# Patient Record
Sex: MALE | Race: WHITE
[De-identification: names, ages, dates, MRNs, and addresses within clinical notes are randomized per-mention and may not be internally consistent; named-entity substitution may affect disease eponyms.]

---

## 2018-06-22 ENCOUNTER — HOSPITAL ENCOUNTER (OUTPATIENT)
Dept: HOSPITAL 47 - LABWHC1 | Age: 78
Discharge: HOME | End: 2018-06-22
Payer: MEDICARE

## 2018-06-22 DIAGNOSIS — G52.9: Primary | ICD-10-CM

## 2018-06-22 LAB — BUN SERPL-SCNC: 23 MG/DL (ref 9–20)

## 2018-06-22 PROCEDURE — 82565 ASSAY OF CREATININE: CPT

## 2018-06-22 PROCEDURE — 36415 COLL VENOUS BLD VENIPUNCTURE: CPT

## 2018-06-22 PROCEDURE — 84520 ASSAY OF UREA NITROGEN: CPT

## 2018-06-23 ENCOUNTER — HOSPITAL ENCOUNTER (OUTPATIENT)
Dept: HOSPITAL 47 - RADMRIMAIN | Age: 78
Discharge: HOME | End: 2018-06-23
Attending: NURSE PRACTITIONER
Payer: MEDICARE

## 2018-06-23 DIAGNOSIS — G31.9: Primary | ICD-10-CM

## 2018-06-23 DIAGNOSIS — G93.89: ICD-10-CM

## 2018-06-23 DIAGNOSIS — R90.82: ICD-10-CM

## 2018-06-23 PROCEDURE — 70553 MRI BRAIN STEM W/O & W/DYE: CPT

## 2018-06-23 NOTE — MR
EXAMINATION TYPE: MR brain wo/w con

 

DATE OF EXAM: 6/23/2018

 

COMPARISON: 9/23/2016

 

HISTORY: 78-year-old male cranial nerve disorder unspecified, dizziness

 

TECHNIQUE:  Multiplanar, multisequence images of the brain and brainstem were acquired before and aft
er administration of  7 mL IV Gadavist.  Diffusion weighted imaging is performed. 

 

FINDINGS:  

No evidence for acute infarction, hemorrhage, mass, mass effect, midline shift, herniation, effacemen
t of basal cisterns, or extra-axial fluid collection. 

 

There is moderate generalized supratentorial volume loss with secondary mild ventriculomegaly.

 

Major intracranial flow voids are intact. Dominant left vertebral artery.

 

T2/FLAIR weighted sequences show moderate scattered burden of T2 bright white matter change in both c
erebral hemispheres in the subcortical, deep white matter, and periventricular regions. These changes
 are minimally increased from the prior exam.

 

Midline structures demonstrate partially empty sella and otherwise normal morphology.  The craniocerv
ical junction is normal. 

 

No cerebellopontine angle mass or lesion in the internal auditory canal.

 

Post contrast images demonstrate no evidence of pathologic enhancement.  Dural venous sinuses are pat
ent.

 

Small mucosal retention cyst along the floor of the right maxillary sinus. Mild mucosal thickening le
ft ethmoid air cells. Globes are intact.

 

 

IMPRESSION:

 

1. Similar moderate atrophy and mild ex vacuo ventriculomegaly.

2. Moderate scattered T2 bright white matter change, nonspecific, likely relating to changes of chron
ic small vessel ischemic disease. Minimally increased from 2016.

3. No CP angle or IAC mass.

4. No abnormal enhancing lesions. No acute intracranial abnormality.

## 2021-03-05 ENCOUNTER — HOSPITAL ENCOUNTER (OUTPATIENT)
Dept: HOSPITAL 47 - LABWHC1 | Age: 81
Discharge: HOME | End: 2021-03-05
Attending: SURGERY
Payer: MEDICARE

## 2021-03-05 DIAGNOSIS — Z20.822: Primary | ICD-10-CM

## 2021-03-12 ENCOUNTER — HOSPITAL ENCOUNTER (OUTPATIENT)
Dept: HOSPITAL 47 - ORWHC2ENDO | Age: 81
Discharge: HOME | End: 2021-03-12
Attending: SURGERY
Payer: MEDICARE

## 2021-03-12 VITALS — RESPIRATION RATE: 16 BRPM

## 2021-03-12 VITALS — SYSTOLIC BLOOD PRESSURE: 117 MMHG | HEART RATE: 64 BPM | DIASTOLIC BLOOD PRESSURE: 56 MMHG

## 2021-03-12 VITALS — TEMPERATURE: 97.9 F

## 2021-03-12 VITALS — BODY MASS INDEX: 20.9 KG/M2

## 2021-03-12 DIAGNOSIS — Z79.899: ICD-10-CM

## 2021-03-12 DIAGNOSIS — Z79.1: ICD-10-CM

## 2021-03-12 DIAGNOSIS — M10.9: ICD-10-CM

## 2021-03-12 DIAGNOSIS — K57.31: ICD-10-CM

## 2021-03-12 DIAGNOSIS — Z90.79: ICD-10-CM

## 2021-03-12 DIAGNOSIS — Z80.7: ICD-10-CM

## 2021-03-12 DIAGNOSIS — D12.5: Primary | ICD-10-CM

## 2021-03-12 DIAGNOSIS — H40.9: ICD-10-CM

## 2021-03-12 DIAGNOSIS — F02.80: ICD-10-CM

## 2021-03-12 DIAGNOSIS — Z85.46: ICD-10-CM

## 2021-03-12 DIAGNOSIS — G47.52: ICD-10-CM

## 2021-03-12 DIAGNOSIS — G20: ICD-10-CM

## 2021-03-12 DIAGNOSIS — Z98.890: ICD-10-CM

## 2021-03-12 PROCEDURE — 45385 COLONOSCOPY W/LESION REMOVAL: CPT

## 2021-03-12 PROCEDURE — 88305 TISSUE EXAM BY PATHOLOGIST: CPT

## 2021-03-12 NOTE — P.GSHP
History of Present Illness


H&P Date: 03/12/21





81-year-old male presents today for a colonoscopy.  He has had a recent positive

 ColoGuard test.  He admits to a small amount of blood in his stool.  His last 

colonoscopy was approximately 6 years ago.





- Review of Systems


All systems: negative





Past Medical History


Past Medical History: Dementia, Eye Disorder


Additional Past Medical History / Comment(s): difficult to get bowel movement 

started and stools size of a pencil,positive cologuard,hx REM sleep disorder-

severe muscle twitches/thrashing at times,parkinson's,gout,vaccinated w/ both 

covid vaccines,glaucoma efren eyes,prostate CA


History of Any Multi-Drug Resistant Organisms: None Reported


Past Surgical History: Hernia Repair, Prostate Surgery


Additional Past Surgical History / Comment(s): prostatectomy


Past Anesthesia/Blood Transfusion Reactions: No Reported Reaction


Smoking Status: Never smoker





- Past Family History


  ** Mother


Family Medical History: No Reported History





  ** Father


Family Medical History: Cancer


Additional Family Medical History / Comment(s): blood CA





Medications and Allergies


                                Home Medications











 Medication  Instructions  Recorded  Confirmed  Type


 


Cholecalciferol [Vitamin D3 (25 25 mcg PO DAILY 03/10/21 03/10/21 History





Mcg = 1000 Iu)]    


 


Donepezil HCl [Aricept] 10 mg PO HS 03/10/21 03/10/21 History


 


Ginkgo Biloba Leaf Extract [Ginkgo 120 mg PO DAILY 03/10/21 03/10/21 History





Biloba]    


 


Latanoprost Ophth [Xalatan 0.005%] 1 drops BOTH EYES HS 03/10/21 03/10/21 

History


 


Memantine HCl 10 mg PO BID 03/10/21 03/10/21 History


 


Naproxen Sodium [Aleve] 220 mg PO BID 03/10/21 03/10/21 History


 


Psyllium Husk (with Sugar) 1 tsp PO HS 03/10/21 03/10/21 History





[Metamucil Powder]    


 


Purge Supplement 2 tab PO DAILY 03/10/21 03/10/21 History


 


Timolol 0.5% Ophth Soln [Timoptic 1 drop BOTH EYES QAM 03/10/21 03/10/21 History





0.5% Ophth Soln]    


 


Turmeric Root Extract [Turmeric] 3,000 mg PO DAILY 03/10/21 03/10/21 History


 


Vitamin B-50  1 tab PO DAILY 03/10/21 03/10/21 History


 


polyethylene glycoL 3350 [Miralax] 17 gm PO HS 03/10/21 03/10/21 History








                                    Allergies











Allergy/AdvReac Type Severity Reaction Status Date / Time


 


No Known Allergies Allergy   Verified 03/12/21 08:41














Surgical - Exam


Osteopathic Statement: *.  No significant issues noted on an osteopathic 

structural exam other than those noted in the History and Physical/Consult.


                                   Vital Signs











Temp Pulse Resp BP Pulse Ox


 


 97.9 F   60   18   142/79   97 


 


 03/12/21 09:00  03/12/21 09:00  03/12/21 09:00  03/12/21 09:00  03/12/21 09:00














- General


well developed, well nourished





- ENT


decreased hearing





- Neck


trachea midline





- Abdomen


Abdomen: soft, non tender





- Psychiatric


oriented to time, oriented to person, oriented to place





Assessment and Plan


Plan: 





81-year-old male with positive Cologuard test.  Plan is for colonoscopy.  Risks,

 benefits and alternatives were provided to the patient.  He did provide consent

 prior to attending the endoscopy suite.

## 2021-03-12 NOTE — P.PCN
Date of Procedure: 03/12/21


Preoperative Diagnosis: 


Positive Cologuard test


Postoperative Diagnosis: 


Sigmoid colon polyp


Diverticulosis


Procedure(s) Performed: 


Colonoscopy with hot snare polypectomy


Anesthesia: MAC


Surgeon: Shreya Heller


Pathology: other (Sigmoid colon polyp)


Condition: stable


Disposition: same day


Indications for Procedure: 


81-year-old male with recent positive cologuard test.  He also admits to 

occasional small amount of blood in his stool.  Last colonoscopy was 6 years 

ago.  Risks, benefits and alternatives to the procedure were provided to the 

patient.  He did provide consent prior to attending the endoscopy suite.


Operative Findings: 


Sigmoid colon polyp


Diverticulosis


Description of Procedure: 


The patient was brought into the endoscopy suite.  He was then placed in left 

lateral decubitus position and adequate sedation was achieved using conscious 

sedation.  A digital rectal exam was performed and mild internal hemorrhoids 

were palpated.  An endoscope was then placed in the rectum and advanced to the 

cecum as identified by landmarks including the appendiceal orifice and the 

ileocecal valve.  The prep was good.  The colonoscope was then slowly withdrawn,

examining for any mucosal abnormalities.  The cecum, ascending, transverse, 

descending and sigmoid colon were visualized adequately.  There were no large 

neoplastic lesions throughout the colon.  A small polyp was noted in the sigmoid

colon.  This was removed with hot snare polypectomy.  Significant amount of 

diverticulosis was noted scattered throughout the descending and sigmoid colon. 

Retroflexion was performed in the rectum and internal hemorrhoids were visible. 

Excess air was removed, the colonoscope withdrawn and the procedure terminated. 

The patient was then transferred to the recovery unit in stable condition.  

Repeat colonoscopy should be performed based on patient's symptoms due to his 

age.

## 2022-06-19 ENCOUNTER — HOSPITAL ENCOUNTER (INPATIENT)
Dept: HOSPITAL 47 - EC | Age: 82
LOS: 12 days | Discharge: HOME | DRG: 177 | End: 2022-07-01
Attending: HOSPITALIST | Admitting: HOSPITALIST
Payer: MEDICARE

## 2022-06-19 VITALS — BODY MASS INDEX: 20.4 KG/M2

## 2022-06-19 DIAGNOSIS — Z79.899: ICD-10-CM

## 2022-06-19 DIAGNOSIS — G47.10: ICD-10-CM

## 2022-06-19 DIAGNOSIS — Z85.828: ICD-10-CM

## 2022-06-19 DIAGNOSIS — N17.9: ICD-10-CM

## 2022-06-19 DIAGNOSIS — E86.0: ICD-10-CM

## 2022-06-19 DIAGNOSIS — G30.9: ICD-10-CM

## 2022-06-19 DIAGNOSIS — Z20.822: ICD-10-CM

## 2022-06-19 DIAGNOSIS — D72.810: ICD-10-CM

## 2022-06-19 DIAGNOSIS — G20: ICD-10-CM

## 2022-06-19 DIAGNOSIS — I50.9: ICD-10-CM

## 2022-06-19 DIAGNOSIS — N18.9: ICD-10-CM

## 2022-06-19 DIAGNOSIS — T50.905A: ICD-10-CM

## 2022-06-19 DIAGNOSIS — G47.52: ICD-10-CM

## 2022-06-19 DIAGNOSIS — G47.33: ICD-10-CM

## 2022-06-19 DIAGNOSIS — Z90.79: ICD-10-CM

## 2022-06-19 DIAGNOSIS — J69.0: Primary | ICD-10-CM

## 2022-06-19 DIAGNOSIS — R13.10: ICD-10-CM

## 2022-06-19 DIAGNOSIS — Z87.01: ICD-10-CM

## 2022-06-19 DIAGNOSIS — N13.9: ICD-10-CM

## 2022-06-19 DIAGNOSIS — R33.9: ICD-10-CM

## 2022-06-19 DIAGNOSIS — F02.80: ICD-10-CM

## 2022-06-19 DIAGNOSIS — G92.8: ICD-10-CM

## 2022-06-19 LAB
ALBUMIN SERPL-MCNC: 3.9 G/DL (ref 3.5–5)
ALP SERPL-CCNC: 69 U/L (ref 38–126)
ALT SERPL-CCNC: 14 U/L (ref 4–49)
ANION GAP SERPL CALC-SCNC: 8 MMOL/L
AST SERPL-CCNC: 28 U/L (ref 17–59)
BASOPHILS # BLD AUTO: 0.2 K/UL (ref 0–0.2)
BASOPHILS NFR BLD AUTO: 3 %
BUN SERPL-SCNC: 26 MG/DL (ref 9–20)
CALCIUM SPEC-MCNC: 8.6 MG/DL (ref 8.4–10.2)
CHLORIDE SERPL-SCNC: 103 MMOL/L (ref 98–107)
CO2 SERPL-SCNC: 27 MMOL/L (ref 22–30)
EOSINOPHIL # BLD AUTO: 0.2 K/UL (ref 0–0.7)
EOSINOPHIL NFR BLD AUTO: 4 %
ERYTHROCYTE [DISTWIDTH] IN BLOOD BY AUTOMATED COUNT: 4.53 M/UL (ref 4.3–5.9)
ERYTHROCYTE [DISTWIDTH] IN BLOOD: 12.4 % (ref 11.5–15.5)
GLUCOSE SERPL-MCNC: 89 MG/DL (ref 74–99)
HCT VFR BLD AUTO: 40.9 % (ref 39–53)
HGB BLD-MCNC: 13.7 GM/DL (ref 13–17.5)
LYMPHOCYTES # SPEC AUTO: 0.5 K/UL (ref 1–4.8)
LYMPHOCYTES NFR SPEC AUTO: 9 %
MCH RBC QN AUTO: 30.3 PG (ref 25–35)
MCHC RBC AUTO-ENTMCNC: 33.5 G/DL (ref 31–37)
MCV RBC AUTO: 90.3 FL (ref 80–100)
MONOCYTES # BLD AUTO: 0.7 K/UL (ref 0–1)
MONOCYTES NFR BLD AUTO: 13 %
NEUTROPHILS # BLD AUTO: 3.7 K/UL (ref 1.3–7.7)
NEUTROPHILS NFR BLD AUTO: 68 %
PH UR: 6.5 [PH] (ref 5–8)
PLATELET # BLD AUTO: 183 K/UL (ref 150–450)
POTASSIUM SERPL-SCNC: 4.6 MMOL/L (ref 3.5–5.1)
PROT SERPL-MCNC: 6.9 G/DL (ref 6.3–8.2)
SODIUM SERPL-SCNC: 138 MMOL/L (ref 137–145)
SP GR UR: 1.01 (ref 1–1.03)
UROBILINOGEN UR QL STRIP: <2 MG/DL (ref ?–2)
WBC # BLD AUTO: 5.5 K/UL (ref 3.8–10.6)

## 2022-06-19 PROCEDURE — 71045 X-RAY EXAM CHEST 1 VIEW: CPT

## 2022-06-19 PROCEDURE — 82607 VITAMIN B-12: CPT

## 2022-06-19 PROCEDURE — 81003 URINALYSIS AUTO W/O SCOPE: CPT

## 2022-06-19 PROCEDURE — 80053 COMPREHEN METABOLIC PANEL: CPT

## 2022-06-19 PROCEDURE — 82746 ASSAY OF FOLIC ACID SERUM: CPT

## 2022-06-19 PROCEDURE — 84484 ASSAY OF TROPONIN QUANT: CPT

## 2022-06-19 PROCEDURE — 94640 AIRWAY INHALATION TREATMENT: CPT

## 2022-06-19 PROCEDURE — 74176 CT ABD & PELVIS W/O CONTRAST: CPT

## 2022-06-19 PROCEDURE — 87070 CULTURE OTHR SPECIMN AEROBIC: CPT

## 2022-06-19 PROCEDURE — 96374 THER/PROPH/DIAG INJ IV PUSH: CPT

## 2022-06-19 PROCEDURE — 83605 ASSAY OF LACTIC ACID: CPT

## 2022-06-19 PROCEDURE — 87502 INFLUENZA DNA AMP PROBE: CPT

## 2022-06-19 PROCEDURE — 94760 N-INVAS EAR/PLS OXIMETRY 1: CPT

## 2022-06-19 PROCEDURE — 87205 SMEAR GRAM STAIN: CPT

## 2022-06-19 PROCEDURE — 93005 ELECTROCARDIOGRAM TRACING: CPT

## 2022-06-19 PROCEDURE — 82140 ASSAY OF AMMONIA: CPT

## 2022-06-19 PROCEDURE — 80076 HEPATIC FUNCTION PANEL: CPT

## 2022-06-19 PROCEDURE — 84443 ASSAY THYROID STIM HORMONE: CPT

## 2022-06-19 PROCEDURE — 83880 ASSAY OF NATRIURETIC PEPTIDE: CPT

## 2022-06-19 PROCEDURE — 95816 EEG AWAKE AND DROWSY: CPT

## 2022-06-19 PROCEDURE — 84145 PROCALCITONIN (PCT): CPT

## 2022-06-19 PROCEDURE — 80048 BASIC METABOLIC PNL TOTAL CA: CPT

## 2022-06-19 PROCEDURE — 87040 BLOOD CULTURE FOR BACTERIA: CPT

## 2022-06-19 PROCEDURE — 70551 MRI BRAIN STEM W/O DYE: CPT

## 2022-06-19 PROCEDURE — 87635 SARS-COV-2 COVID-19 AMP PRB: CPT

## 2022-06-19 PROCEDURE — 85025 COMPLETE CBC W/AUTO DIFF WBC: CPT

## 2022-06-19 PROCEDURE — 93306 TTE W/DOPPLER COMPLETE: CPT

## 2022-06-19 PROCEDURE — 99285 EMERGENCY DEPT VISIT HI MDM: CPT

## 2022-06-19 PROCEDURE — 36415 COLL VENOUS BLD VENIPUNCTURE: CPT

## 2022-06-19 PROCEDURE — 70450 CT HEAD/BRAIN W/O DYE: CPT

## 2022-06-19 PROCEDURE — 83735 ASSAY OF MAGNESIUM: CPT

## 2022-06-19 PROCEDURE — 71046 X-RAY EXAM CHEST 2 VIEWS: CPT

## 2022-06-19 RX ADMIN — MEMANTINE HYDROCHLORIDE SCH MG: 10 TABLET ORAL at 22:51

## 2022-06-19 RX ADMIN — CEFAZOLIN SCH MLS/HR: 330 INJECTION, POWDER, FOR SOLUTION INTRAMUSCULAR; INTRAVENOUS at 18:42

## 2022-06-19 RX ADMIN — LATANOPROST SCH DROPS: 50 SOLUTION OPHTHALMIC at 22:52

## 2022-06-19 NOTE — ED
URI HPI





- General


Chief Complaint: Upper Respiratory Infection


Stated Complaint: SOB, weakness


Time Seen by Provider: 06/19/22 14:04


Source: patient, family


Mode of arrival: wheelchair


Limitations: no limitations





- History of Present Illness


Initial Comments: 


Patient is an 82-year-old male with history of Parkinson's presenting with chief

complaint of URI-like symptoms.  His wife at bedside states that earlier in the 

week she came down with cold, and now he is starting to have the same symptoms. 

He has sounded congested, has been having a cough but he has not been able to 

produce sputum, and generalized weakness.  His wife states that there has been 

no sudden change in his mental status.  She states that today she noticed he had

a fever.  Denies any chest pain, shortness of breath, sore throat, dysphagia, 

headache, vision or hearing changes, abdominal pain, vomiting, diarrhea, 

dysuria, hematuria.








- Related Data


                                Home Medications











 Medication  Instructions  Recorded  Confirmed


 


Cholecalciferol [Vitamin D3 (25 25 mcg PO DAILY 03/10/21 03/12/21





Mcg = 1000 Iu)]   


 


Donepezil HCl [Aricept] 10 mg PO HS 03/10/21 03/12/21


 


Latanoprost Ophth [Xalatan 0.005%] 1 drops BOTH EYES HS 03/10/21 03/12/21


 


Memantine HCl 10 mg PO BID 03/10/21 03/12/21


 


Timolol 0.5% Ophth Soln [Timoptic 1 drop BOTH EYES QA 03/10/21 03/12/21





0.5% Ophth Soln]   


 


Turmeric Root Extract [Turmeric] 3,000 mg PO DAILY 03/10/21 03/12/21


 


Vitamin B-50  1 tab PO DAILY 03/10/21 03/12/21


 


polyethylene glycoL 3350 [Miralax] 17 gm PO HS 03/10/21 03/12/21


 


Carbidopa-Levodopa  mg 1 tab PO DAILY@0800 06/19/22 06/19/22





[Sinemet ]   


 


Carbidopa-Levodopa  mg 2 tab PO DAILY@1300 06/19/22 06/19/22





[Sinemet ]   


 


Ginkgo Biloba Leaf Extract [Ginkgo 125 mg PO DAILY 06/19/22 06/19/22





Biloba]   


 


allopurinoL [Allopurinol] 100 mg PO W/SUPPER 06/19/22 06/19/22











                                    Allergies











Allergy/AdvReac Type Severity Reaction Status Date / Time


 


No Known Allergies Allergy   Verified 06/19/22 16:06














Review of Systems


ROS Statement: 


Those systems with pertinent positive or pertinent negative responses have been 

documented in the HPI.





ROS Other: All systems not noted in ROS Statement are negative.





Past Medical History


Past Medical History: Dementia


Additional Past Medical History / Comment(s): Parkinson


History of Any Multi-Drug Resistant Organisms: None Reported


Past Surgical History: Hernia Repair, Prostate Surgery


Past Psychological History: No Psychological Hx Reported


Smoking Status: Never smoker


Past Alcohol Use History: None Reported


Past Drug Use History: None Reported





General Exam


Limitations: altered mental status (Due to Parkinson's), physical limitation 

(Due to Parkinson's)


General appearance: alert, in no apparent distress


Head exam: Present: atraumatic, normocephalic, normal inspection


Eye exam: Present: normal appearance, EOMI.  Absent: scleral icterus


ENT exam: Present: normal exam, normal oropharynx, mucous membranes moist, TM's 

normal bilaterally, normal external ear exam


Neck exam: Present: normal inspection.  Absent: tenderness


Respiratory exam: Present: rales.  Absent: respiratory distress, wheezes, 

rhonchi, stridor


Cardiovascular Exam: Present: regular rate, normal rhythm, normal heart sounds. 

Absent: systolic murmur, diastolic murmur, rubs, gallop, clicks


GI/Abdominal exam: Present: soft, normal bowel sounds.  Absent: distended, 

tenderness, guarding, rebound, rigid


Extremities exam: Present: normal inspection, full ROM.  Absent: tenderness


Neurological exam: Present: alert, altered (Wife and son at bedside states that 

this is his baseline), CN II-XII intact


  ** Expanded


Speech: Present: fluid speech


Cranial nerves: EOM's Intact: Normal, Tongue Deviation: Normal, Facial 

Sensation: Normal


Motor strength exam: RUE: 5, LUE: 5, RLE: 5, LLE: 5


Eye Response: (4) open spontaneously


Motor Response: (6) obeys commands


Verbal Response: (5) oriented


Chelsie Total: 15


Psychiatric exam: Present: normal affect, normal mood


Skin exam: Present: warm, dry, intact, normal color.  Absent: rash





Course


                                   Vital Signs











  06/19/22 06/19/22





  13:59 17:35


 


Temperature 98.9 F 


 


Pulse Rate 88 78


 


Respiratory 22 18





Rate  


 


Blood Pressure 145/79 141/96


 


O2 Sat by Pulse 98 97





Oximetry  














Medical Decision Making





- Medical Decision Making


Patient is an 82-year-old male with history of Parkinson's presenting with chief

complaint of fever and productive cough.  His wife who is has recently been ill 

states that he is also had some increased generalized weakness.  On examination 

there are some rales on auscultation of the lungs.  At this time he is afebrile.

 He is negative for Covid and influenza.  Chest x-ray suggests congestive heart 

failure with small pleural effusions, atheromatous aorta.  Acute pneumonia is 

not excluded.  Lab work obtained shows no elevated white count, BNP and troponin

are WNL.  Creatinine BUN a mildly elevated, no recent values for comparison.  

Given the patient's presentation, I feel this is more likely pneumonia, and 

given his advanced age in Parkinson's, I feel his best outcome would come from 

inpatient admission.  I spoke to Mariela Rogers from [facility] who agreed to admit the

patient.  I discussed this plan with the patient and his family, they conveyed 

verbal understanding and agreed to the plan.  I discussed this case with my 

attending Dr. Stoner.








- Lab Data


Result diagrams: 


                                 06/19/22 15:48





                                 06/19/22 15:48


                                   Lab Results











  06/19/22 06/19/22 06/19/22 Range/Units





  14:32 14:32 15:20 


 


WBC     (3.8-10.6)  k/uL


 


RBC     (4.30-5.90)  m/uL


 


Hgb     (13.0-17.5)  gm/dL


 


Hct     (39.0-53.0)  %


 


MCV     (80.0-100.0)  fL


 


MCH     (25.0-35.0)  pg


 


MCHC     (31.0-37.0)  g/dL


 


RDW     (11.5-15.5)  %


 


Plt Count     (150-450)  k/uL


 


MPV     


 


Neutrophils %     %


 


Lymphocytes %     %


 


Monocytes %     %


 


Eosinophils %     %


 


Basophils %     %


 


Neutrophils #     (1.3-7.7)  k/uL


 


Lymphocytes #     (1.0-4.8)  k/uL


 


Monocytes #     (0-1.0)  k/uL


 


Eosinophils #     (0-0.7)  k/uL


 


Basophils #     (0-0.2)  k/uL


 


Sodium     (137-145)  mmol/L


 


Potassium     (3.5-5.1)  mmol/L


 


Chloride     ()  mmol/L


 


Carbon Dioxide     (22-30)  mmol/L


 


Anion Gap     mmol/L


 


BUN     (9-20)  mg/dL


 


Creatinine     (0.66-1.25)  mg/dL


 


Est GFR (CKD-EPI)AfAm     (>60 ml/min/1.73 sqM)  


 


Est GFR (CKD-EPI)NonAf     (>60 ml/min/1.73 sqM)  


 


Glucose     (74-99)  mg/dL


 


Plasma Lactic Acid Nikhil     (0.7-2.0)  mmol/L


 


Calcium     (8.4-10.2)  mg/dL


 


Total Bilirubin     (0.2-1.3)  mg/dL


 


AST     (17-59)  U/L


 


ALT     (4-49)  U/L


 


Alkaline Phosphatase     ()  U/L


 


Troponin I     (0.000-0.034)  ng/mL


 


NT-Pro-B Natriuret Pep     pg/mL


 


Total Protein     (6.3-8.2)  g/dL


 


Albumin     (3.5-5.0)  g/dL


 


Urine Color    Yellow  


 


Urine Appearance    Clear  (Clear)  


 


Urine pH    6.5  (5.0-8.0)  


 


Ur Specific Gravity    1.013  (1.001-1.035)  


 


Urine Protein    Negative  (Negative)  


 


Urine Glucose (UA)    Negative  (Negative)  


 


Urine Ketones    Negative  (Negative)  


 


Urine Blood    Negative  (Negative)  


 


Urine Nitrite    Negative  (Negative)  


 


Urine Bilirubin    Negative  (Negative)  


 


Urine Urobilinogen    <2.0  (<2.0)  mg/dL


 


Ur Leukocyte Esterase    Negative  (Negative)  


 


Coronavirus (PCR)   Not Detected   (Not Detectd)  


 


Influenza Type A RNA  Not Detected    (Not Detectd)  


 


Influenza Type B (PCR)  Not Detected    (Not Detectd)  














  06/19/22 06/19/22 06/19/22 Range/Units





  15:48 15:48 15:48 


 


WBC  5.5    (3.8-10.6)  k/uL


 


RBC  4.53    (4.30-5.90)  m/uL


 


Hgb  13.7    (13.0-17.5)  gm/dL


 


Hct  40.9    (39.0-53.0)  %


 


MCV  90.3    (80.0-100.0)  fL


 


MCH  30.3    (25.0-35.0)  pg


 


MCHC  33.5    (31.0-37.0)  g/dL


 


RDW  12.4    (11.5-15.5)  %


 


Plt Count  183    (150-450)  k/uL


 


MPV  8.0    


 


Neutrophils %  68    %


 


Lymphocytes %  9    %


 


Monocytes %  13    %


 


Eosinophils %  4    %


 


Basophils %  3    %


 


Neutrophils #  3.7    (1.3-7.7)  k/uL


 


Lymphocytes #  0.5 L    (1.0-4.8)  k/uL


 


Monocytes #  0.7    (0-1.0)  k/uL


 


Eosinophils #  0.2    (0-0.7)  k/uL


 


Basophils #  0.2    (0-0.2)  k/uL


 


Sodium   138   (137-145)  mmol/L


 


Potassium   4.6   (3.5-5.1)  mmol/L


 


Chloride   103   ()  mmol/L


 


Carbon Dioxide   27   (22-30)  mmol/L


 


Anion Gap   8   mmol/L


 


BUN   26 H   (9-20)  mg/dL


 


Creatinine   1.29 H   (0.66-1.25)  mg/dL


 


Est GFR (CKD-EPI)AfAm   60   (>60 ml/min/1.73 sqM)  


 


Est GFR (CKD-EPI)NonAf   52   (>60 ml/min/1.73 sqM)  


 


Glucose   89   (74-99)  mg/dL


 


Plasma Lactic Acid Nikhil    1.2  (0.7-2.0)  mmol/L


 


Calcium   8.6   (8.4-10.2)  mg/dL


 


Total Bilirubin   0.3   (0.2-1.3)  mg/dL


 


AST   28   (17-59)  U/L


 


ALT   14   (4-49)  U/L


 


Alkaline Phosphatase   69   ()  U/L


 


Troponin I     (0.000-0.034)  ng/mL


 


NT-Pro-B Natriuret Pep     pg/mL


 


Total Protein   6.9   (6.3-8.2)  g/dL


 


Albumin   3.9   (3.5-5.0)  g/dL


 


Urine Color     


 


Urine Appearance     (Clear)  


 


Urine pH     (5.0-8.0)  


 


Ur Specific Gravity     (1.001-1.035)  


 


Urine Protein     (Negative)  


 


Urine Glucose (UA)     (Negative)  


 


Urine Ketones     (Negative)  


 


Urine Blood     (Negative)  


 


Urine Nitrite     (Negative)  


 


Urine Bilirubin     (Negative)  


 


Urine Urobilinogen     (<2.0)  mg/dL


 


Ur Leukocyte Esterase     (Negative)  


 


Coronavirus (PCR)     (Not Detectd)  


 


Influenza Type A RNA     (Not Detectd)  


 


Influenza Type B (PCR)     (Not Detectd)  














  06/19/22 06/19/22 Range/Units





  15:48 15:48 


 


WBC    (3.8-10.6)  k/uL


 


RBC    (4.30-5.90)  m/uL


 


Hgb    (13.0-17.5)  gm/dL


 


Hct    (39.0-53.0)  %


 


MCV    (80.0-100.0)  fL


 


MCH    (25.0-35.0)  pg


 


MCHC    (31.0-37.0)  g/dL


 


RDW    (11.5-15.5)  %


 


Plt Count    (150-450)  k/uL


 


MPV    


 


Neutrophils %    %


 


Lymphocytes %    %


 


Monocytes %    %


 


Eosinophils %    %


 


Basophils %    %


 


Neutrophils #    (1.3-7.7)  k/uL


 


Lymphocytes #    (1.0-4.8)  k/uL


 


Monocytes #    (0-1.0)  k/uL


 


Eosinophils #    (0-0.7)  k/uL


 


Basophils #    (0-0.2)  k/uL


 


Sodium    (137-145)  mmol/L


 


Potassium    (3.5-5.1)  mmol/L


 


Chloride    ()  mmol/L


 


Carbon Dioxide    (22-30)  mmol/L


 


Anion Gap    mmol/L


 


BUN    (9-20)  mg/dL


 


Creatinine    (0.66-1.25)  mg/dL


 


Est GFR (CKD-EPI)AfAm    (>60 ml/min/1.73 sqM)  


 


Est GFR (CKD-EPI)NonAf    (>60 ml/min/1.73 sqM)  


 


Glucose    (74-99)  mg/dL


 


Plasma Lactic Acid Nikhil    (0.7-2.0)  mmol/L


 


Calcium    (8.4-10.2)  mg/dL


 


Total Bilirubin    (0.2-1.3)  mg/dL


 


AST    (17-59)  U/L


 


ALT    (4-49)  U/L


 


Alkaline Phosphatase    ()  U/L


 


Troponin I   <0.012  (0.000-0.034)  ng/mL


 


NT-Pro-B Natriuret Pep  325   pg/mL


 


Total Protein    (6.3-8.2)  g/dL


 


Albumin    (3.5-5.0)  g/dL


 


Urine Color    


 


Urine Appearance    (Clear)  


 


Urine pH    (5.0-8.0)  


 


Ur Specific Gravity    (1.001-1.035)  


 


Urine Protein    (Negative)  


 


Urine Glucose (UA)    (Negative)  


 


Urine Ketones    (Negative)  


 


Urine Blood    (Negative)  


 


Urine Nitrite    (Negative)  


 


Urine Bilirubin    (Negative)  


 


Urine Urobilinogen    (<2.0)  mg/dL


 


Ur Leukocyte Esterase    (Negative)  


 


Coronavirus (PCR)    (Not Detectd)  


 


Influenza Type A RNA    (Not Detectd)  


 


Influenza Type B (PCR)    (Not Detectd)  














Disposition


Clinical Impression: 


 Pneumonia





Disposition: ADMITTED AS IP TO THIS [facility]


Condition: Good


Is patient prescribed a controlled substance at d/c from ED?: No


Referrals: 


Xenia Camacho MD [Primary Care Provider] - 1-2 days


Time of Disposition: 17:54


Decision to Admit Reason: Admit from EC


Decision Date: 06/19/22


Decision Time: 17:54

## 2022-06-19 NOTE — XR
EXAMINATION TYPE: XR chest 2V

 

DATE OF EXAM: 6/19/2022

 

COMPARISON: NONE

 

HISTORY: Cough

 

TECHNIQUE: 2 views

 

FINDINGS: Heart is enlarged. There is some pulmonary vascular congestion. There is blunting of the co
stophrenic angles.

 

IMPRESSION: Congestive heart failure with small pleural effusions. Atheromatous aorta. Acute pneumoni
a not excluded.

## 2022-06-20 LAB
ALBUMIN SERPL-MCNC: 3.5 G/DL (ref 3.5–5)
ALBUMIN/GLOB SERPL: 1.3 {RATIO}
ALP SERPL-CCNC: 65 U/L (ref 38–126)
ALT SERPL-CCNC: 16 U/L (ref 4–49)
ANION GAP SERPL CALC-SCNC: 8 MMOL/L
AST SERPL-CCNC: 26 U/L (ref 17–59)
BASOPHILS # BLD AUTO: 0.1 K/UL (ref 0–0.2)
BASOPHILS NFR BLD AUTO: 3 %
BUN SERPL-SCNC: 17 MG/DL (ref 9–20)
CALCIUM SPEC-MCNC: 8.4 MG/DL (ref 8.4–10.2)
CHLORIDE SERPL-SCNC: 105 MMOL/L (ref 98–107)
CO2 SERPL-SCNC: 26 MMOL/L (ref 22–30)
EOSINOPHIL # BLD AUTO: 0.1 K/UL (ref 0–0.7)
EOSINOPHIL NFR BLD AUTO: 3 %
ERYTHROCYTE [DISTWIDTH] IN BLOOD BY AUTOMATED COUNT: 4.46 M/UL (ref 4.3–5.9)
ERYTHROCYTE [DISTWIDTH] IN BLOOD: 12.2 % (ref 11.5–15.5)
GLOBULIN SER CALC-MCNC: 2.7 G/DL
GLUCOSE SERPL-MCNC: 95 MG/DL (ref 74–99)
HCT VFR BLD AUTO: 39.9 % (ref 39–53)
HGB BLD-MCNC: 13.4 GM/DL (ref 13–17.5)
LYMPHOCYTES # SPEC AUTO: 0.6 K/UL (ref 1–4.8)
LYMPHOCYTES NFR SPEC AUTO: 14 %
MAGNESIUM SPEC-SCNC: 1.8 MG/DL (ref 1.6–2.3)
MCH RBC QN AUTO: 30.2 PG (ref 25–35)
MCHC RBC AUTO-ENTMCNC: 33.7 G/DL (ref 31–37)
MCV RBC AUTO: 89.4 FL (ref 80–100)
MONOCYTES # BLD AUTO: 0.6 K/UL (ref 0–1)
MONOCYTES NFR BLD AUTO: 13 %
NEUTROPHILS # BLD AUTO: 2.8 K/UL (ref 1.3–7.7)
NEUTROPHILS NFR BLD AUTO: 63 %
PH UR: 7 [PH] (ref 5–8)
PLATELET # BLD AUTO: 172 K/UL (ref 150–450)
POTASSIUM SERPL-SCNC: 4.3 MMOL/L (ref 3.5–5.1)
PROT SERPL-MCNC: 6.2 G/DL (ref 6.3–8.2)
SODIUM SERPL-SCNC: 139 MMOL/L (ref 137–145)
SP GR UR: 1.01 (ref 1–1.03)
UROBILINOGEN UR QL STRIP: <2 MG/DL (ref ?–2)
WBC # BLD AUTO: 4.5 K/UL (ref 3.8–10.6)

## 2022-06-20 RX ADMIN — Medication SCH MCG: at 08:43

## 2022-06-20 RX ADMIN — HEPARIN SODIUM SCH UNIT: 5000 INJECTION INTRAVENOUS; SUBCUTANEOUS at 19:56

## 2022-06-20 RX ADMIN — DONEPEZIL HYDROCHLORIDE SCH MG: 10 TABLET ORAL at 08:43

## 2022-06-20 RX ADMIN — DOXYCYCLINE SCH MLS/HR: 100 INJECTION, POWDER, LYOPHILIZED, FOR SOLUTION INTRAVENOUS at 12:53

## 2022-06-20 RX ADMIN — MEMANTINE HYDROCHLORIDE SCH MG: 10 TABLET ORAL at 08:43

## 2022-06-20 RX ADMIN — CEFAZOLIN SCH: 330 INJECTION, POWDER, FOR SOLUTION INTRAMUSCULAR; INTRAVENOUS at 23:08

## 2022-06-20 RX ADMIN — CEFAZOLIN SCH MLS/HR: 330 INJECTION, POWDER, FOR SOLUTION INTRAMUSCULAR; INTRAVENOUS at 12:54

## 2022-06-20 RX ADMIN — FAMOTIDINE SCH MG: 10 INJECTION, SOLUTION INTRAVENOUS at 15:06

## 2022-06-20 RX ADMIN — CARBIDOPA AND LEVODOPA SCH EACH: 25; 100 TABLET ORAL at 08:43

## 2022-06-20 RX ADMIN — TIMOLOL MALEATE SCH DROPS: 5 SOLUTION OPHTHALMIC at 08:44

## 2022-06-20 RX ADMIN — DOXYCYCLINE SCH MLS/HR: 100 INJECTION, POWDER, LYOPHILIZED, FOR SOLUTION INTRAVENOUS at 19:56

## 2022-06-20 RX ADMIN — MEMANTINE HYDROCHLORIDE SCH MG: 10 TABLET ORAL at 19:57

## 2022-06-20 RX ADMIN — CARBIDOPA AND LEVODOPA SCH EACH: 25; 100 TABLET ORAL at 13:38

## 2022-06-20 RX ADMIN — LATANOPROST SCH DROPS: 50 SOLUTION OPHTHALMIC at 19:57

## 2022-06-20 NOTE — CA
Transthoracic Echo Report 

 Name: Balwinder Montalvo 

 MRN:    [id number] 

 Age:    82     Gender:     M 

 

 :    1940 

 Exam Date:     2022 11:25 

 Exam Location: [facility] 

 Ht (in):     72     Wt (lb):     116 

 Ordering Physician:        Rafael Resendiz MD 

 Attending/Referring Phys:         [id number], Sheet 

 Technician         Angelika Mitchell, [facility] 

 Procedure CPT: 

 Indications:       Rule out heart disease 

 

 Cardiac Hx: 

 Technical Quality:      Fair 

 Contrast 1:                                Total Dose (mL): 

 Contrast 2:                                Total Dose (mL): 

 

 MEASUREMENTS  (Male / Female) Normal Values 

 2D ECHO 

 LV Diastolic Diameter PLAX        4.5 cm                4.2 - 5.9 / 3.9 - 5.3 cm 

 LV Systolic Diameter PLAX         2.8 cm                 

 IVS Diastolic Thickness           1.1 cm                0.6 - 1.0 / 0.6 - 0.9 cm 

 LVPW Diastolic Thickness          1.4 cm                0.6 - 1.0 / 0.6 - 0.9 cm 

 LV Relative Wall Thickness        0.5                    

 RV Internal Dim ED PLAX           2.8 cm                 

 LA Volume                         43.0 cm???              18 - 58 / 22 - 52 cm??? 

 

 M-MODE 

 Aortic Root Diameter MM           2.6 cm                 

 LA Systolic Diameter MM           3.6 cm                 

 LA Ao Ratio MM                    1.4                    

 AV Cusp Separation MM             1.8 cm                 

 

 DOPPLER 

 AV Peak Velocity                  117.8 cm/s             

 AV Peak Gradient                  5.5 mmHg               

 AI Peak Velocity                  457.4 cm/s             

 AI Peak Gradient                  83.7 mmHg              

 AI Pressure Half Time             664.5 ms               

 LVOT Peak Velocity                83.0 cm/s              

 LVOT Peak Gradient                2.8 mmHg               

 MV Area PHT                       3.5 cm???                

 Mitral E Point Velocity           60.3 cm/s              

 Mitral A Point Velocity           58.2 cm/s              

 Mitral E to A Ratio               1.0                    

 MV Deceleration Time              215.0 ms               

 TR Peak Velocity                  180.8 cm/s             

 TR Peak Gradient                  13.1 mmHg              

 Right Ventricular Systolic Press  18.1 mmHg              

 

 

 FINDINGS 

 Left Ventricle 

 Mildly increased left ventricular wall thickness. Normal left ventricular  

 systolic function with no obvious regional wall motion abnormalities. Left  

 ventricular ejection fraction is estimated at 55-60 %. 

 

 Right Ventricle 

 Normal right ventricular size and function. Right ventricular systolic pressure  

 within normal limits. 

 

 Right Atrium 

 Right atrium not well visualized. 

 

 Left Atrium 

 Normal left atrial size. No evidence for an atrial septal defect. 

 

 Mitral Valve 

 Structurally normal mitral valve. Trace to mild mitral regurgitation. 

 

 Aortic Valve 

 Trileaflet aortic valve. Mild aortic regurgitation. 

 

 Tricuspid Valve 

 Mild tricuspid regurgitation. 

 

 Pulmonic Valve 

 Structurally normal pulmonic valve. Trace pulmonic regurgitation. 

 

 Pericardium 

 No pericardial effusion. 

 

 Aorta 

 Normal size aortic root and proximal ascending aorta. 

 

 CONCLUSIONS 

 Normal LV systolic function and mild aortic regurgitation 

 Previewed by:  

 Dr. Donaldo Mccoy MD 

 (Electronically Signed) 

 Final Date:      2022 17:19

## 2022-06-20 NOTE — P.HPIM
History of Present Illness





This is a pleasant 82 minute with past medical history of : Dementia, Pneumonia,

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


Presents because of cough and subjective fever and generalized weakness.  

Patient somewhat disappointed historian and information was obtained with the 

help of his son they'll at bedside.  Family stated that patient has been 

complaining of from cough as well as his wife, so they took him to the clinic in

[place] which referred him to the hospital for his weakness.  As per son patient 

has been having this cough and for the last 3 days and it is associated in the 

beginning with sore throat.  Also he had some runny nose but the son states that

this is a chronic.  Currently no sore throat, no chest pain or dyspnea at rest. 

Patient denies fever.


No diarrhea or vomiting


Her family stated that patient have difficulty getting up from chair or walking,

he started using her walker the last few days 


Also patient looks confused.  The family also does not look much concerned 

because this been happening up and down.  Patient knows he is in the hospital 

but could not tell which hospital or sitting, he is disoriented to time and 

person.  He thought his son they'll at bedside is his brother Walter.  Patient also

has no insight into his illness


Patient also has history of Parkinson disease for many years, he has mild 

resting tremor today.


Patient also complaining of from difficulty passing urine but no dysuria.


No smoking, alcohol or illicit tracts





Patient is hemodynamically stable.  He is afebrile.


Labs including CBC showed only mild lymphopenia, BMP showing mildly elevated 

creatinine 1.29 compared to baseline of 1.1.


Rest of BMP and liver enzymes are unremarkable.  Troponin is negative less than 

0.012.  ProBNP is negative at 325.  Urinalysis is negative.


Coronavirus and influenza viruses are undetected


EKG showing junctional rhythm


While chest x-ray showing congestive heart failure with small pleural effusion 

as per report 


On admission patient received 1 dose of ceftriaxone.  He was started on














Review of Systems





CONSTITUTIONAL: No fever, no malaise, no fatigue. 


HEENT: No recent visual problems or hearing problems. Denied any sore throat. 


CARDIOVASCULAR: No  orthopnea, PND, no palpitations, no syncope. 


PULMONARY: No shortness of breath, no cough, no hemoptysis. 


GASTROINTESTINAL: No diarrhea, no nausea, no vomiting, no abdominal pain. 

Normoactive bowel sounds. 


NEUROLOGICAL: No headaches, no weakness, no numbness. 


HEMATOLOGICAL: Denies any bleeding or petechiae. 


GENITOURINARY: Denies any burning micturition, frequency, or urgency. 


MUSCULOSKELETAL/RHEUMATOLOGICAL: Denies any joint pain, swelling, or any muscle 

pain. 


ENDOCRINE: Denies any polyuria or polydipsia.











Past Medical History


Past Medical History: Cancer, Dementia, Pneumonia, Prostate Disorder


Additional Past Medical History / Comment(s): Parkinson, skin cancer removed; 

prostate removed


History of Any Multi-Drug Resistant Organisms: None Reported


Past Surgical History: Hernia Repair, Prostate Surgery


Additional Past Surgical History / Comment(s): skin cancer removal


Past Anesthesia/Blood Transfusion Reactions: No Reported Reaction


Past Psychological History: No Psychological Hx Reported


Smoking Status: Never smoker


Past Alcohol Use History: None Reported


Past Drug Use History: None Reported





Medications and Allergies


                                Home Medications











 Medication  Instructions  Recorded  Confirmed  Type


 


Cholecalciferol [Vitamin D3 (25 50 mcg PO DAILY 03/10/21 06/19/22 History





Mcg = 1000 Iu)]    


 


Donepezil HCl [Aricept] 10 mg PO HS 03/10/21 06/19/22 History


 


Latanoprost Ophth [Xalatan 0.005%] 1 drops BOTH EYES HS 03/10/21 06/19/22 

History


 


Memantine HCl 10 mg PO BID 03/10/21 06/19/22 History


 


Timolol 0.5% Ophth Soln [Timoptic 1 drop BOTH EYES DAILY 03/10/21 06/19/22 

History





0.5% Ophth Soln]    


 


Turmeric Root Extract [Turmeric] 1,000 mg PO DAILY 03/10/21 06/19/22 History


 


Vitamin B-50  1 tab PO DAILY 03/10/21 06/19/22 History


 


polyethylene glycoL 3350 [Miralax] 17 gm PO HS 03/10/21 06/19/22 History


 


Carbidopa-Levodopa  mg 1 tab PO DAILY@0800 06/19/22 06/19/22 History





[Sinemet ]    


 


Carbidopa-Levodopa  mg 2 tab PO DAILY@1300 06/19/22 06/19/22 History





[Sinemet ]    


 


Ginkgo Biloba Leaf Extract [Ginkgo 125 mg PO DAILY 06/19/22 06/19/22 History





Biloba]    


 


Psyllium Husk (with Sugar) 1 dose PO HS 06/19/22 06/19/22 History





[Metamucil Powder]    


 


allopurinoL [Allopurinol] 100 mg PO W/SUPPER 06/19/22 06/19/22 History








                                    Allergies











Allergy/AdvReac Type Severity Reaction Status Date / Time


 


No Known Allergies Allergy   Verified 06/19/22 16:06














Physical Exam


Vitals: 


                                   Vital Signs











  Temp Pulse Pulse Resp BP BP Pulse Ox


 


 06/20/22 05:09  97.8 F   79  16   137/74  94 L


 


 06/19/22 22:23  98.3 F   75  16   155/89  96


 


 06/19/22 21:36   85   18  157/68   95


 


 06/19/22 17:35   78   18  141/96   97


 


 06/19/22 13:59  98.9 F  88   22  145/79   98








                                Intake and Output











 06/19/22 06/19/22 06/20/22





 14:59 22:59 06:59


 


Intake Total   710


 


Balance   710


 


Intake:   


 


  Oral   710


 


Other:   


 


  Voiding Method   Urinal





   Diaper





   Incontinent


 


  # Voids   1


 


  Weight 72.575 kg 53 kg 














-GENERAL: The patient is alert and oriented x1 partially to time, not in any 

acute distress.  under developed 


HEENT: Pupils are round and equally reacting to light. EOMI. No scleral icterus.

No conjunctival pallor. Normocephalic, atraumatic. No pharyngeal erythema. No 

thyromegaly. 


CARDIOVASCULAR: S1 and S2 present. No murmurs, rubs, or gallops. 


-PULMONARY: Chest is clear to auscultation, no wheezing or crackles.  Normal 

basal crepitation


ABDOMEN: Soft, nontender, nondistended, normoactive bowel sounds. No palpable 

organomegaly. 


MUSCULOSKELETAL: No joint swelling or deformity. 


EXTREMITIES: No cyanosis, clubbing, or pedal edema. 


-NEUROLOGICAL: Gross neurological examination did not reveal any focal deficits.

 Patient is confused, he follows commands.  Cranial nerves are grossly intact.  

Strength is 5/5 in all extremities and sensation is intact.


SKIN: No rashes. No petechiae








Results


CBC & Chem 7: 


                                 06/20/22 05:50





                                 06/20/22 05:50


Labs: 


                  Abnormal Lab Results - Last 24 Hours (Table)











  06/19/22 06/19/22 Range/Units





  15:48 15:48 


 


Lymphocytes #  0.5 L   (1.0-4.8)  k/uL


 


BUN   26 H  (9-20)  mg/dL


 


Creatinine   1.29 H  (0.66-1.25)  mg/dL














Thrombosis Risk Factor Assmnt





- Choose All That Apply


Any of the Below Risk Factors Present?: No


Other Risk Factors: Yes


Each Risk Factor Represents 3 Points: Age 75 years or older


Other congenital or acquired thrombophilia - If yes, enter type in comment: No


Thrombosis Risk Factor Assessment Total Risk Factor Score: 3


Thrombosis Risk Factor Assessment Level: Moderate Risk





Assessment and Plan


Assessment: 





Bilateral respiratory infiltrate suspicious for community acquired pneumonia.  

Call influenza and covid vv are negative.


Altered mental status most likely indwelling encephalopathy


Generalized weakness and deconditioning


Mild acute kidney injury and dehydration


Alzheimer dementia


History of Parkinson disease


History of prostate disease status post surgical removal of the prostate


History of pneumonia











Plan: 





This is a pleasant 82 old male who presents with pneumonia and metabolic 

encephalopathy


Continuous ceftriaxone and doxycycline


Repeat labs and check pro-calcitonin and BNP


ceftriaxone and doxycycline.


Check CT of the brain


labs and medication were reviewed..  Continue same treatment.  Continue with 

symptomatic treatment.  Resume home medication.  Monitor lytes and vitals.  DVT 

and GI prophylaxis.  Further recommendations depends on the clinical course of 

the patient


DVT prophylaxis: Subcutaneous heparin


GI Prophylaxis: Pepcid


PT/OT: Pending


Prognosis is guarded

## 2022-06-20 NOTE — CT
EXAMINATION TYPE: CT brain wo con

 

DATE OF EXAM: 6/20/2022

 

COMPARISON: MRI dated 6/23/2018

 

HISTORY: confusion

 

CT DLP: 1187 mGycm

Automated exposure control for dose reduction was used.

 

TECHNIQUE: CT scan of the brain is performed without IV contrast administration.

 

FINDINGS: 

Central and cortical brain volume loss changes, possibly age-related however neurodegenerative diseas
e cannot be excluded. Bilateral cerebral white matter hypodensities, likely representing chronic micr
ovascular ischemic changes. 

 

Left external capsule hypodensity, possibly ischemic. Scattered arterial atherosclerotic calcificatio
ns. No acute intracranial hemorrhage. No gross acute cortical infarct. No midline shift or herniation
.

 

Unremarkable basal cisterns, sella and CP angles. No gross space-occupying lesion, vasogenic edema or
 mass effect.

 

Unremarkable orbits. Mucosal thickening of the right maxillary sinus and right ethmoid air cells. Mil
d opacification of the left inferior mastoid air cells. Osteopenia.

 

IMPRESSION: 

No acute intracranial hemorrhage or gross acute cortical infarct. Brain volume loss changes and suspe
cted chronic microvascular ischemic changes with other chronic and incidental findings as described jeri cotter.

## 2022-06-21 LAB
ANION GAP SERPL CALC-SCNC: 13.1 MMOL/L (ref 10–18)
BASOPHILS # BLD AUTO: 0.03 X 10*3/UL (ref 0–0.1)
BASOPHILS NFR BLD AUTO: 0.7 %
BUN SERPL-SCNC: 18.3 MG/DL (ref 9–27)
BUN/CREAT SERPL: 15.25 RATIO (ref 12–20)
CALCIUM SPEC-MCNC: 8.9 MG/DL (ref 8.7–10.3)
CHLORIDE SERPL-SCNC: 104 MMOL/L (ref 96–109)
CO2 SERPL-SCNC: 24.9 MMOL/L (ref 20–27.5)
EOSINOPHIL # BLD AUTO: 0.14 X 10*3/UL (ref 0.04–0.35)
EOSINOPHIL NFR BLD AUTO: 3.4 %
ERYTHROCYTE [DISTWIDTH] IN BLOOD BY AUTOMATED COUNT: 4.96 X 10*6/UL (ref 4.4–5.6)
ERYTHROCYTE [DISTWIDTH] IN BLOOD: 12.3 % (ref 11.5–14.5)
GLUCOSE BLD-MCNC: 102 MG/DL (ref 70–110)
GLUCOSE BLD-MCNC: 131 MG/DL (ref 70–110)
GLUCOSE SERPL-MCNC: 95 MG/DL (ref 70–110)
HCT VFR BLD AUTO: 43.7 % (ref 39.6–50)
HGB BLD-MCNC: 14.1 G/DL (ref 13–17)
IMM GRANULOCYTES BLD QL AUTO: 1 %
LYMPHOCYTES # SPEC AUTO: 0.76 X 10*3/UL (ref 0.9–5)
LYMPHOCYTES NFR SPEC AUTO: 18.4 %
MAGNESIUM SPEC-SCNC: 1.9 MG/DL (ref 1.5–2.4)
MCH RBC QN AUTO: 28.4 PG (ref 27–32)
MCHC RBC AUTO-ENTMCNC: 32.3 G/DL (ref 32–37)
MCV RBC AUTO: 88.1 FL (ref 80–97)
MONOCYTES # BLD AUTO: 0.77 X 10*3/UL (ref 0.2–1)
MONOCYTES NFR BLD AUTO: 18.6 %
NEUTROPHILS # BLD AUTO: 2.4 X 10*3/UL (ref 1.8–7.7)
NEUTROPHILS NFR BLD AUTO: 57.9 %
NRBC BLD AUTO-RTO: 0 /100 WBCS (ref 0–0)
PLATELET # BLD AUTO: 205 X 10*3/UL (ref 140–440)
POTASSIUM SERPL-SCNC: 4 MMOL/L (ref 3.5–5.5)
SODIUM SERPL-SCNC: 142 MMOL/L (ref 135–145)
WBC # BLD AUTO: 4.14 X 10*3/UL (ref 4.5–10)

## 2022-06-21 RX ADMIN — Medication SCH MCG: at 08:49

## 2022-06-21 RX ADMIN — TIMOLOL MALEATE SCH DROPS: 5 SOLUTION OPHTHALMIC at 12:43

## 2022-06-21 RX ADMIN — HEPARIN SODIUM SCH UNIT: 5000 INJECTION INTRAVENOUS; SUBCUTANEOUS at 19:43

## 2022-06-21 RX ADMIN — METHYLPREDNISOLONE SODIUM SUCCINATE SCH MG: 40 INJECTION, POWDER, FOR SOLUTION INTRAMUSCULAR; INTRAVENOUS at 17:16

## 2022-06-21 RX ADMIN — CARBIDOPA AND LEVODOPA SCH EACH: 25; 100 TABLET ORAL at 08:49

## 2022-06-21 RX ADMIN — CEFAZOLIN SCH: 330 INJECTION, POWDER, FOR SOLUTION INTRAMUSCULAR; INTRAVENOUS at 10:11

## 2022-06-21 RX ADMIN — FAMOTIDINE SCH MG: 10 INJECTION, SOLUTION INTRAVENOUS at 08:50

## 2022-06-21 RX ADMIN — LATANOPROST SCH DROPS: 50 SOLUTION OPHTHALMIC at 10:07

## 2022-06-21 RX ADMIN — MEMANTINE HYDROCHLORIDE SCH MG: 10 TABLET ORAL at 08:50

## 2022-06-21 RX ADMIN — MEMANTINE HYDROCHLORIDE SCH MG: 10 TABLET ORAL at 19:43

## 2022-06-21 RX ADMIN — DOXYCYCLINE SCH MLS/HR: 100 INJECTION, POWDER, LYOPHILIZED, FOR SOLUTION INTRAVENOUS at 10:07

## 2022-06-21 RX ADMIN — PIPERACILLIN AND TAZOBACTAM SCH MLS/HR: 3; .375 INJECTION, POWDER, FOR SOLUTION INTRAVENOUS at 17:27

## 2022-06-21 RX ADMIN — HEPARIN SODIUM SCH UNIT: 5000 INJECTION INTRAVENOUS; SUBCUTANEOUS at 08:50

## 2022-06-21 RX ADMIN — BUDESONIDE SCH MG: 1 SUSPENSION RESPIRATORY (INHALATION) at 20:06

## 2022-06-21 RX ADMIN — IPRATROPIUM BROMIDE AND ALBUTEROL SULFATE SCH ML: .5; 3 SOLUTION RESPIRATORY (INHALATION) at 20:05

## 2022-06-21 RX ADMIN — CARBIDOPA AND LEVODOPA SCH EACH: 25; 100 TABLET ORAL at 12:42

## 2022-06-21 RX ADMIN — DONEPEZIL HYDROCHLORIDE SCH MG: 10 TABLET ORAL at 08:49

## 2022-06-21 NOTE — P.PN
Subjective





This is a pleasant 82 minute with past medical history of : Dementia, Pneumonia,

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


Presents because of cough and subjective fever and generalized weakness.  

Patient somewhat disappointed historian and information was obtained with the 

help of his son they'll at bedside.  Family stated that patient has been 

complaining of from cough as well as his wife, so they took him to the clinic in

[place] which referred him to the hospital for his weakness.  As per son patient 

has been having this cough and for the last 3 days and it is associated in the 

beginning with sore throat.  Also he had some runny nose but the son states that

this is a chronic.  Currently no sore throat, no chest pain or dyspnea at rest. 

Patient denies fever.


No diarrhea or vomiting


Her family stated that patient have difficulty getting up from chair or walking,

he started using her walker the last few days 


Also patient looks confused.  The family also does not look much concerned 

because this been happening up and down.  Patient knows he is in the hospital 

but could not tell which hospital or sitting, he is disoriented to time and 

person.  He thought his son they'll at bedside is his brother Walter.  Patient also

has no insight into his illness


Patient also has history of Parkinson disease for many years, he has mild 

resting tremor today.


Patient also complaining of from difficulty passing urine but no dysuria.


No smoking, alcohol or illicit tracts





Patient is hemodynamically stable.  He is afebrile.


Labs including CBC showed only mild lymphopenia, BMP showing mildly elevated 

creatinine 1.29 compared to baseline of 1.1.


Rest of BMP and liver enzymes are unremarkable.  Troponin is negative less than 

0.012.  ProBNP is negative at 325.  Urinalysis is negative.


Coronavirus and influenza viruses are undetected


EKG showing junctional rhythm


While chest x-ray showing congestive heart failure with small pleural effusion 

as per report 


On admission patient received 1 dose of ceftriaxone.  He was started on





06/21/2022


Patient today still confused, he has difficulty holding stop because of his 

worsening tremors in both upper and lower extremities as well as his head.  It 

looks more stiff.  This could be related to his worsening Parkinson before we 

going to consult neurology service.


Repeat chest x-ray showed clearing of pulmonary congestion and interstitial i

nfiltrates per radiologist.


Echocardiogram showing preserved ejection fraction 55-60%, CT of the brain is 

negative for acute process.


Chest x-ray showed clearing of pulmonary congestion with interstitial 

infiltrates improved radiologist.  Poorcalcitonin is not significantly elevated

and it is only at 0.11.


No fever.  No significant tachycardia or tachypnea.


His medication.  He was started on Solu-Medrol and antibiotics changed to Zosyn.




Also patient will be transferred to


Discussed with the son at bedside.





Objective





- Vital Signs


Vital signs: 


                                   Vital Signs











Temp  97.0 F L  06/21/22 05:00


 


Pulse  77   06/21/22 05:00


 


Resp  18   06/21/22 05:00


 


BP  144/99   06/21/22 05:00


 


Pulse Ox  95   06/21/22 05:00


 


FiO2      








                                 Intake & Output











 06/20/22 06/21/22 06/21/22





 18:59 06:59 18:59


 


Intake Total 975 400 


 


Output Total 300  


 


Balance 675 400 


 


Weight 68.422 kg  


 


Intake:   


 


  Intake, IV Titration 975  





  Amount   


 


    Doxycycline 100 mg In 100  





    Sodium Chloride 0.9% 100   





    ml @ 100 mls/hr IVPB   





    Q12HR [facility] Rx#:[id number]   


 


    Sodium Chloride 0.9% 1, 825  





    000 ml @ 75 mls/hr IV .   





    [id number] [facility] Rx#:[id number]   


 


    cefTRIAXone 1 gm In 50  





    Sodium Chloride 0.9% 50   





    ml @ 100 mls/hr IVPB   





    Q24HR [facility] Rx#:[id number]   


 


  Oral  400 


 


Output:   


 


  Urine 300  


 


Other:   


 


  Voiding Method Urinal Diaper 





 Diaper Incontinent 





 Incontinent  


 


  # Voids 2 4 














- Exam








-GENERAL: The patient is alert and oriented x1 partially to time, not in any 

acute distress.  under developed and generalized weakness


HEENT: Pupils are round and equally reacting to light. EOMI. No scleral icterus.

No conjunctival pallor. Normocephalic, atraumatic. No pharyngeal erythema. No 

thyromegaly. 


CARDIOVASCULAR: S1 and S2 present. No murmurs, rubs, or gallops. 


PULMONARY: Chest is clear to auscultation, no wheezing or crackles.  


ABDOMEN: Soft, nontender, nondistended, normoactive bowel sounds. No palpable 

organomegaly. 


MUSCULOSKELETAL: No joint swelling or deformity. 


EXTREMITIES: No cyanosis, clubbing, or pedal edema. 


-NEUROLOGICAL: Gross neurological examination did not reveal any focal deficits.

 Patient is confused, he follows commands.  Cranial nerves are grossly intact.  

Meningeal signs are absent  He has some generalized weakness but he denies any 

numbness


SKIN: No rashes. No petechiae





- Labs


CBC & Chem 7: 


                                 06/21/22 05:34





                                 06/21/22 05:34


Labs: 


                  Abnormal Lab Results - Last 24 Hours (Table)











  06/20/22 06/21/22 06/21/22 Range/Units





  05:50 05:34 05:34 


 


WBC   4.14 L   (4.50-10.00)  X 10*3/uL


 


Lymphocytes #   0.76 L   (0.90-5.00)  X 10*3/uL


 


Est GFR (CKD-EPI)NonAf    56.4 L  (60.0-200.0)   


 


Procalcitonin  0.11 H    (0.02-0.09)  ng/mL














Assessment and Plan


Assessment: 





Bilateral respiratory infiltrate suspicious for community acquired pneumonia.  

Call influenza and covid vv are negative.


Altered mental status most likely metabolic encephalopathy


Worsening tremor most likely related to Parkinson disease


Generalized weakness and deconditioning


Mild acute kidney injury and dehydration


Alzheimer dementia


History of Parkinson disease


History of prostate disease status post surgical removal of the prostate


History of pneumonia











Plan: 





This is a pleasant 82 old male who presents with pneumonia and metabolic 

encephalopathy


Continuous with antibiotic which is switching to Zosyn 


continue with Solu-Medrol


Chest x-ray is reviewed consult neurology service


Transfer the patient to higher level of care at [facility] and select unit 


Continue with breathing treatment and inhaled steroids 


labs and medication were reviewed..  Continue same treatment.  Continue with 

symptomatic treatment.  Resume home medication.  Monitor lytes and vitals.  DVT 

and GI prophylaxis.  Further recommendations depends on the clinical course of 

the patient


DVT prophylaxis: Subcutaneous heparin


GI Prophylaxis: Pepcid


PT/OT: Pending


Prognosis is guarded

## 2022-06-21 NOTE — XR
EXAMINATION TYPE: XR chest 1V

 

DATE OF EXAM: 6/21/2022

 

COMPARISON: 6/19/2022

 

HISTORY: Confusion

 

TECHNIQUE: Single view

 

FINDINGS: There is no heart failure nor confluent pneumonic infiltrate. Costophrenic angles are clear
. Thoracic aorta is atheromatous.

 

IMPRESSION: No active cardiopulmonary disease. There is clearing of the pulmonary congestion and inte
rstitial edema compared to old exam.

## 2022-06-22 LAB
BASOPHILS # BLD AUTO: 0.1 K/UL (ref 0–0.2)
BASOPHILS NFR BLD AUTO: 1 %
EOSINOPHIL # BLD AUTO: 0 K/UL (ref 0–0.7)
EOSINOPHIL NFR BLD AUTO: 0 %
ERYTHROCYTE [DISTWIDTH] IN BLOOD BY AUTOMATED COUNT: 5.05 M/UL (ref 4.3–5.9)
ERYTHROCYTE [DISTWIDTH] IN BLOOD: 12.6 % (ref 11.5–15.5)
GLUCOSE BLD-MCNC: 119 MG/DL (ref 70–110)
GLUCOSE BLD-MCNC: 125 MG/DL (ref 70–110)
GLUCOSE BLD-MCNC: 127 MG/DL (ref 70–110)
HCT VFR BLD AUTO: 46.2 % (ref 39–53)
HGB BLD-MCNC: 15.1 GM/DL (ref 13–17.5)
LYMPHOCYTES # SPEC AUTO: 0.5 K/UL (ref 1–4.8)
LYMPHOCYTES NFR SPEC AUTO: 5 %
MCH RBC QN AUTO: 30 PG (ref 25–35)
MCHC RBC AUTO-ENTMCNC: 32.8 G/DL (ref 31–37)
MCV RBC AUTO: 91.4 FL (ref 80–100)
MONOCYTES # BLD AUTO: 0.8 K/UL (ref 0–1)
MONOCYTES NFR BLD AUTO: 9 %
NEUTROPHILS # BLD AUTO: 7.6 K/UL (ref 1.3–7.7)
NEUTROPHILS NFR BLD AUTO: 84 %
PLATELET # BLD AUTO: 213 K/UL (ref 150–450)
VIT B12 SERPL-MCNC: 1290 PG/ML (ref 200–944)
WBC # BLD AUTO: 9 K/UL (ref 3.8–10.6)

## 2022-06-22 RX ADMIN — IPRATROPIUM BROMIDE AND ALBUTEROL SULFATE SCH: .5; 3 SOLUTION RESPIRATORY (INHALATION) at 20:04

## 2022-06-22 RX ADMIN — IPRATROPIUM BROMIDE AND ALBUTEROL SULFATE SCH ML: .5; 3 SOLUTION RESPIRATORY (INHALATION) at 15:55

## 2022-06-22 RX ADMIN — BUDESONIDE SCH MG: 1 SUSPENSION RESPIRATORY (INHALATION) at 07:30

## 2022-06-22 RX ADMIN — PIPERACILLIN AND TAZOBACTAM SCH MLS/HR: 3; .375 INJECTION, POWDER, FOR SOLUTION INTRAVENOUS at 17:43

## 2022-06-22 RX ADMIN — PIPERACILLIN AND TAZOBACTAM SCH MLS/HR: 3; .375 INJECTION, POWDER, FOR SOLUTION INTRAVENOUS at 00:11

## 2022-06-22 RX ADMIN — PIPERACILLIN AND TAZOBACTAM SCH MLS/HR: 3; .375 INJECTION, POWDER, FOR SOLUTION INTRAVENOUS at 08:08

## 2022-06-22 RX ADMIN — LATANOPROST SCH DROPS: 50 SOLUTION OPHTHALMIC at 21:27

## 2022-06-22 RX ADMIN — HEPARIN SODIUM SCH UNIT: 5000 INJECTION INTRAVENOUS; SUBCUTANEOUS at 08:10

## 2022-06-22 RX ADMIN — DONEPEZIL HYDROCHLORIDE SCH MG: 10 TABLET ORAL at 17:46

## 2022-06-22 RX ADMIN — TIMOLOL MALEATE SCH: 5 SOLUTION OPHTHALMIC at 11:19

## 2022-06-22 RX ADMIN — MEMANTINE HYDROCHLORIDE SCH MG: 10 TABLET ORAL at 21:26

## 2022-06-22 RX ADMIN — IPRATROPIUM BROMIDE AND ALBUTEROL SULFATE SCH ML: .5; 3 SOLUTION RESPIRATORY (INHALATION) at 11:03

## 2022-06-22 RX ADMIN — CARBIDOPA AND LEVODOPA SCH EACH: 25; 100 TABLET ORAL at 17:46

## 2022-06-22 RX ADMIN — Medication SCH MCG: at 17:45

## 2022-06-22 RX ADMIN — BUDESONIDE SCH: 1 SUSPENSION RESPIRATORY (INHALATION) at 20:04

## 2022-06-22 RX ADMIN — METHYLPREDNISOLONE SODIUM SUCCINATE SCH: 40 INJECTION, POWDER, FOR SOLUTION INTRAMUSCULAR; INTRAVENOUS at 08:49

## 2022-06-22 RX ADMIN — CARBIDOPA AND LEVODOPA SCH: 25; 100 TABLET ORAL at 11:19

## 2022-06-22 RX ADMIN — MEMANTINE HYDROCHLORIDE SCH MG: 10 TABLET ORAL at 17:45

## 2022-06-22 RX ADMIN — HEPARIN SODIUM SCH UNIT: 5000 INJECTION INTRAVENOUS; SUBCUTANEOUS at 21:26

## 2022-06-22 RX ADMIN — MEMANTINE HYDROCHLORIDE SCH: 10 TABLET ORAL at 11:19

## 2022-06-22 RX ADMIN — FAMOTIDINE SCH MG: 10 INJECTION, SOLUTION INTRAVENOUS at 08:09

## 2022-06-22 RX ADMIN — Medication SCH: at 11:19

## 2022-06-22 RX ADMIN — DONEPEZIL HYDROCHLORIDE SCH: 10 TABLET ORAL at 11:19

## 2022-06-22 RX ADMIN — IPRATROPIUM BROMIDE AND ALBUTEROL SULFATE SCH ML: .5; 3 SOLUTION RESPIRATORY (INHALATION) at 07:30

## 2022-06-22 RX ADMIN — MEMANTINE HYDROCHLORIDE SCH: 10 TABLET ORAL at 21:35

## 2022-06-22 RX ADMIN — DEXTROSE MONOHYDRATE AND SODIUM CHLORIDE SCH MLS/HR: 5; .9 INJECTION, SOLUTION INTRAVENOUS at 21:48

## 2022-06-22 RX ADMIN — METHYLPREDNISOLONE SODIUM SUCCINATE SCH MG: 40 INJECTION, POWDER, FOR SOLUTION INTRAMUSCULAR; INTRAVENOUS at 00:12

## 2022-06-22 NOTE — XR
EXAMINATION TYPE: XR chest 1V

 

DATE OF EXAM: 6/22/2022

 

COMPARISON: X-ray dated 6/21/2022

 

HISTORY: Shortness of breath

 

TECHNIQUE: Single frontal view of the chest is obtained.

 

FINDINGS:  

Increased cardiac transverse diameter. No significant pulmonary consolidation. No sizable pleural eff
usion or pneumothorax.

 

Aortic atherosclerotic calcifications. Degenerative changes of the thoracic spine. Suspected osteopen
ia.

 

IMPRESSION:  

As above.

## 2022-06-22 NOTE — CDI
Documentation Clarification Form



Date: 06/22/2022 03:14:45 PM

From: Jenna Valdez CCS, CCDS

Phone: [phone]

MRN: [id number]

Admit Date: 06/19/2022 05:39:00 PM

Patient Name: Balwinder Montalvo

Visit Number: [id number]

Discharge Date:  





ATTENTION: The Clinical Documentation Specialists (CDI) and [facility] Coding Staff 
appreciate your assistance in clarifying documentation. Please respond to the 
clarification below the line at the bottom and electronically sign. The CDI & 
[facility] Coding staff will review the response and follow-up if needed. Please note: 
Queries are made part of the Legal Health Record. If you have any questions, 
please contact the author of this message via ITS.



Dr. Rafael NAPOLES. Sheet:



Heart Failure is documented in the 6/19 ED Note per the CXR, in the 6/20 H/P per
the CXR and in the subsequent Progress Note on 6/21.



Additional information regarding the Type & Acuity of CHF is requested. 



History/Risk Factors per the 6/20 H/P: Prostate Cancer status post surgery with 
prostate removed, Dementia, Parkinson's Disease, Pneumonia, Skin Cancer: 
removed. 



Clinical Indicators: Presented to the ED on 6/19 with SOB and weakness, URI 
symptoms, congestion, cough, fever. 

Admit with Pneumonia



6/19 VS: T 98.9, P 88, R 22, /79, PO 98 RA, BMI: 20.5

6/19 LAB: Lymph 0.5; BUN 26, Creatinine 1.29, .



6/19 CXR: Congestive heart failure with small pleural effusions. Atheromatous 
aorta. Acute pneumonia not excluded. 

6/21 CXR: No active cardiopulmonary disease. Clearing of pulmonary congestion & 
interstitial edema. 

6/22 CXR: Increased cardiac transverse diameter, no significant pulmonary 
consolidation, no sizable pleural effusion or pneumothorax. Aortic 
atherosclerotic calcifications. 



6/20 ECHO: Normal left ventricular systolic function w/no obvious regional wall 
motion abnormalities, Left ventricular EF 55-60%. Trace to mild MR, Mild AR, 
Mild TR. Trace pulmonic regurgitation. 



Treatment 6/19: IV Rocephin 1,000 mg x1

6/20: IV Rocephin 50 mls @ 100 mls/hr q24H, IV Doxycycline 100 mls @ 100 mls/hr 
q12H, Heparin sq 5,000 units q12H

6/21: IV Lasix 20 mg x1, INH Duoneb 3 mg QID/prn x2, IV Zosyn 100 mls @ 25 
mls/hr q8H, INH Pulmicort 1 mg BID santiago



In your professional opinion, can you please clarify the Type & Acuity of CHF if
known?



[  ] Acute Diastolic Heart Failure

[  ] Chronic Diastolic Heart Failure

[  ] Acute on Chronic Diastolic Heart Failure 

[  ] Other, please specify:________________

[  ] Unable to determine



(Template Last Revised: February 2021)

___________________________________________________________________________

no acute chf

MTDD

## 2022-06-22 NOTE — P.PN
Subjective





This is a pleasant 82 minute with past medical history of : Dementia, Pneumonia,

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


Presents because of cough and subjective fever and generalized weakness.  

Patient somewhat disappointed historian and information was obtained with the 

help of his son they'll at bedside.  Family stated that patient has been 

complaining of from cough as well as his wife, so they took him to the clinic in

[place] which referred him to the hospital for his weakness.  As per son patient 

has been having this cough and for the last 3 days and it is associated in the 

beginning with sore throat.  Also he had some runny nose but the son states that

this is a chronic.  Currently no sore throat, no chest pain or dyspnea at rest. 

Patient denies fever.


No diarrhea or vomiting


Her family stated that patient have difficulty getting up from chair or walking,

he started using her walker the last few days 


Also patient looks confused.  The family also does not look much concerned 

because this been happening up and down.  Patient knows he is in the hospital 

but could not tell which hospital or sitting, he is disoriented to time and 

person.  He thought his son they'll at bedside is his brother Walter.  Patient also

has no insight into his illness


Patient also has history of Parkinson disease for many years, he has mild 

resting tremor today.


Patient also complaining of from difficulty passing urine but no dysuria.


No smoking, alcohol or illicit tracts





Patient is hemodynamically stable.  He is afebrile.


Labs including CBC showed only mild lymphopenia, BMP showing mildly elevated 

creatinine 1.29 compared to baseline of 1.1.


Rest of BMP and liver enzymes are unremarkable.  Troponin is negative less than 

0.012.  ProBNP is negative at 325.  Urinalysis is negative.


Coronavirus and influenza viruses are undetected


EKG showing junctional rhythm


While chest x-ray showing congestive heart failure with small pleural effusion 

as per report 


On admission patient received 1 dose of ceftriaxone.  He was started on





06/21/2022


Patient today still confused, he has difficulty holding stop because of his 

worsening tremors in both upper and lower extremities as well as his head.  It 

looks more stiff.  This could be related to his worsening Parkinson before we 

going to consult neurology service.


Repeat chest x-ray showed clearing of pulmonary congestion and interstitial i

nfiltrates per radiologist.


Echocardiogram showing preserved ejection fraction 55-60%, CT of the brain is 

negative for acute process.


Chest x-ray showed clearing of pulmonary congestion with interstitial 

infiltrates improved radiologist.  Poorcalcitonin is not significantly elevated

and it is only at 0.11.


No fever.  No significant tachycardia or tachypnea.


His medication.  He was started on Solu-Medrol and antibiotics changed to Zosyn.




Also patient will be transferred to


Discussed with the son at bedside.  





02/22/2022


I assessed the patient twice first occurred in the morning and later on together

with the neurologist, and both occasions the Sunday was at bedside.  Patient 

today is more confused, he does not follow commands or answer questions, he has 

tremor and abnormal jerking movements of both of upper and lower extremities 

especially at rest but more patient tried to move or patient provoked.


Patient withdrawal to painful stimuli.  No abnormal posters.  Patient paid 

attention and opens eyes when call his name.  Neurologist on the case and 

recommended EEG which shows no seizure-like activity.  There is mild-to-moderate

encephalopathy.


Repeat his x-ray showing no consolidation and was reviewed by myself.


Patient remains on Zosyn and we will repeatcalcitonin also we will repeat other

labs.  Repeat CBC, BMP magnesium, liver enzymes and poorcalcitonin.


B12, hemoglobin A1c, folic acid and TSH were unremarkable.


Echocardiogram showed ejection fraction of 55-60%.


Suspicion of infection is low however patient tripped on antibiotic.  

Possibility patient has last dementia and Parkinson disease, also with may be 

affected by medication.  Therefore steroids was discontinued today.  Patient on 

high-dose of donpezil , memantine and Sinemet.














Objective





- Vital Signs


Vital signs: 


                                   Vital Signs











Temp  97.9 F   06/22/22 08:00


 


Pulse  72   06/22/22 08:00


 


Resp  18   06/22/22 08:00


 


BP  100/63   06/22/22 08:00


 


Pulse Ox  100   06/22/22 08:00


 


FiO2      








                                 Intake & Output











 06/21/22 06/22/22 06/22/22





 18:59 06:59 18:59


 


Other:   


 


  Voiding Method Diaper Diaper 





 Incontinent Incontinent 


 


  # Voids  1 1


 


  # Bowel Movements  1 














- Exam








-GENERAL: The patient is confused, appetite to verbal stimuli, does not follow 

commands, not in any acute distress.  under developed and generalized weakness


HEENT: Pupils are round and equally reacting to light. EOMI. No scleral icterus.

No conjunctival pallor. Normocephalic, atraumatic. No pharyngeal erythema. No 

thyromegaly. 


CARDIOVASCULAR: S1 and S2 present. No murmurs, rubs, or gallops. 


PULMONARY: Chest is clear to auscultation, no wheezing or crackles.  


ABDOMEN: Soft, nontender, nondistended, normoactive bowel sounds. No palpable 

organomegaly. 


MUSCULOSKELETAL: No joint swelling or deformity. 


EXTREMITIES: No cyanosis, clubbing, or pedal edema. 


-NEUROLOGICAL: Gross neurological examination did not reveal any focal deficits.

 Patient is confused, he does not follows commands.  Cranial nerves are grossly 

intact.  Meningeal signs are absent  He has some generalized weakness but he 

denies any numbness


SKIN: No rashes. No petechiae





- Labs


CBC & Chem 7: 


                                 06/21/22 05:34





                                 06/21/22 05:34


Labs: 


                  Abnormal Lab Results - Last 24 Hours (Table)











  06/21/22 Range/Units





  19:47 


 


POC Glucose (mg/dL)  131 H  ()  mg/dL














Assessment and Plan


Assessment: 








Altered mental status most likely metabolic and more toxic encephalopathy, the 

top of advanced dementia


Worsening tremor most likely related to Parkinson disease


Bilateral respiratory infiltrate suspicious for community acquired pneumonia.  I

mproved.  Suspicion of pneumonia is low, not entirely excluded.  Suspicion of 

procedure physical 


Generalized weakness and deconditioning


Mild acute kidney injury and dehydration, improved


Alzheimer dementia


History of Parkinson disease


History of prostate disease status post surgical removal of the prostate


History of pneumonia











Plan: 





This is a pleasant 82 old male who presents with pneumonia and metabolic 

encephalopathy


Continuous with  Zosyn 


Discontinue Solu-Medrol


Neurology consult


EEG reviewed, no need for antiseizure medication for now


Continue with breathing treatment and inhaled steroids 


labs and medication were reviewed..  Continue same treatment.  Continue with 

symptomatic treatment.  Resume home medication.  Monitor lytes and vitals.  DVT 

and GI prophylaxis.  Further recommendations depends on the clinical course of 

the patient


DVT prophylaxis: Subcutaneous heparin


GI Prophylaxis: Pepcid


PT/OT: Pending


Prognosis is guarded

## 2022-06-22 NOTE — P.CNNES
History of Present Illness


Consult date: 06/22/22


Requesting physician: Rafael E Martín


Reason for Consult: worsening Parkinson's symptoms


History of Present Illness: 


This is an 82-year-old gentleman with medical history of Parkinson's disease, 

REM sleep behavior disorder, dementia who presented because of cough fever and 

generalized weakness.  History was obtained from the patient's son (in person) 

and wife (via phone).  According to the wife is seems that the patient has been 

having generalized weakness, cough and fever in the last 1 week.  She stated 

that the patient has history of Parkinson's as well as REM sleep behavior 

disorder and follows-up with his neurologist (Dr. Wade).  She denied that the 

patient has any seizure in the past.  The primary team is that concerning the 

that the patient is more confused than normal and concerned about seizure.  It 

seems during this hopsital visit primary suspected pneumonia and gave steroids.


Patient is on Sinemet 03496 2 tablets daily as well as 15488 tablet daily.  

Aricept 10 mg daily at bedtime, amantadine 10 mg 1 tablet twice a day.  








Some of the workup during this admission in our facility consisted of:


Patient has been afebrile during this hospital visit.


White blood cell has been for the most part within the normal range


Chemistry panel is unremarkable.


Urinalysis is negative for urinary tract infection.


Corona virus PCR was not detected.  Influenza A and B is not detected.


CT of the head is reported as no acute intracranial hemorrhage or gross acute 

cortical infarct.  Brain volume loss changes and suspected chronic microvascular

ischemic changes with other chronic an incidental finding.


I personally reviewed the CT of the head and I agree there is no acute or 

subacute ischemic and there is no truncal hemorrhage.











Review of Systems


Review of system is limited but the pertinent positive and negative as per HPI.








Past Medical History


Past Medical History: Cancer, Dementia, Pneumonia, Prostate Disorder


Additional Past Medical History / Comment(s): Parkinson, skin cancer removed; 

prostate removed


History of Any Multi-Drug Resistant Organisms: None Reported


Past Surgical History: Hernia Repair, Prostate Surgery


Additional Past Surgical History / Comment(s): skin cancer removal


Past Anesthesia/Blood Transfusion Reactions: No Reported Reaction


Past Psychological History: No Psychological Hx Reported


Smoking Status: Never smoker


Past Alcohol Use History: None Reported


Past Drug Use History: None Reported





Medications and Allergies


                                Home Medications











 Medication  Instructions  Recorded  Confirmed  Type


 


Cholecalciferol [Vitamin D3 (25 50 mcg PO DAILY 03/10/21 06/19/22 History





Mcg = 1000 Iu)]    


 


Donepezil HCl [Aricept] 10 mg PO HS 03/10/21 06/19/22 History


 


Latanoprost Ophth [Xalatan 0.005%] 1 drops BOTH EYES HS 03/10/21 06/19/22 

History


 


Memantine HCl 10 mg PO BID 03/10/21 06/19/22 History


 


Timolol 0.5% Ophth Soln [Timoptic 1 drop BOTH EYES DAILY 03/10/21 06/19/22 

History





0.5% Ophth Soln]    


 


Turmeric Root Extract [Turmeric] 1,000 mg PO DAILY 03/10/21 06/19/22 History


 


Vitamin B-50  1 tab PO DAILY 03/10/21 06/19/22 History


 


polyethylene glycoL 3350 [Miralax] 17 gm PO HS 03/10/21 06/19/22 History


 


Carbidopa-Levodopa  mg 1 tab PO DAILY@0800 06/19/22 06/19/22 History





[Sinemet ]    


 


Carbidopa-Levodopa  mg 2 tab PO DAILY@1300 06/19/22 06/19/22 History





[Sinemet ]    


 


Ginkgo Biloba Leaf Extract [Ginkgo 125 mg PO DAILY 06/19/22 06/19/22 History





Biloba]    


 


Psyllium Husk (with Sugar) 1 dose PO HS 06/19/22 06/19/22 History





[Metamucil Powder]    


 


allopurinoL [Allopurinol] 100 mg PO W/SUPPER 06/19/22 06/19/22 History








                                    Allergies











Allergy/AdvReac Type Severity Reaction Status Date / Time


 


No Known Allergies Allergy   Verified 06/19/22 16:06














Physical Examination





- Vital Signs


Vital Signs: 


                                   Vital Signs











  Temp Pulse Pulse Resp BP BP Pulse Ox


 


 06/22/22 08:00  97.9 F   72  18  100/63   100


 


 06/22/22 07:47   78     


 


 06/22/22 07:35        99


 


 06/22/22 07:30   76   15   


 


 06/22/22 04:00  97.8 F   74  18   128/63  97


 


 06/22/22 00:28  98.5 F   90  20   113/62  97


 


 06/21/22 21:45  97.7 F   98  20   121/72  94 L


 


 06/21/22 20:19   72   16   


 


 06/21/22 20:06   74   16   


 


 06/21/22 20:00    92  20   


 


 06/21/22 19:39  98.6 F   92  20  134/89   93 L


 


 06/21/22 17:58    65   148/90   99


 


 06/21/22 17:57    68   172/99   79 L


 


 06/21/22 17:45    71  16  155/94   94 L


 


 06/21/22 17:31   69   16   


 


 06/21/22 17:28        97


 


 06/21/22 17:21   67   16   


 


 06/21/22 16:55      151/99   85 L


 


 06/21/22 16:50      168/103   90 L


 


 06/21/22 11:50  99.1 F   67  18  115/72   90 L








                                Intake and Output











 06/21/22 06/22/22 06/22/22





 22:59 06:59 14:59


 


Other:   


 


  Voiding Method Diaper  





 Incontinent  


 


  # Voids 1 1 


 


  # Bowel Movements 1  











GENERAL: The patient is lying in bed and does not seem in acute distress.


CHEST: The heart rate is regular rate rhythm.   No murmurs to auscultation.  


LUNG: Clear to auscultation bilaterally no wheezing noted throughout.  Not 

labored breathing.


ABDOMEN/GI: Bowel sounds present in all 4 quadrants. No tenderness to palpation 

throughout.





NEUROLOGICAL:


Limited since patient is stupor.


Higher mental function: The patient is stupor condition.  He will open his eyes 

with voice or shaking him on shoulder.  Not following commands or verbalizing.  

 


Cranial nerves: The pupils are round, equal 2mmd and reactive to light.  Initial

 primary gaze was looking to right but then was center later. No facial 

weakness.   Rest is limited.    


Motor: The strength is hard to assess but would withdrawal extremities and hard 

to defect focal deficits.  


Cerebellum: Unable to assess.


Sensation: Unable to assess light touch.  


Reflexes (right/left): 1+ throughout.


Plantars are mute bilaterally. 








Results





- Laboratory Findings


CBC and BMP: 


                                 06/21/22 05:34





                                 06/21/22 05:34


Abnormal Lab Findings: 


                                  Abnormal Labs











  06/19/22 06/19/22 06/20/22





  15:48 15:48 05:50


 


WBC   


 


Lymphocytes #  0.5 L   0.6 L


 


BUN   26 H 


 


Creatinine   1.29 H 


 


Est GFR (CKD-EPI)NonAf   


 


POC Glucose (mg/dL)   


 


Total Protein   


 


Procalcitonin   














  06/20/22 06/20/22 06/21/22





  05:50 05:50 05:34


 


WBC    4.14 L


 


Lymphocytes #    0.76 L


 


BUN   


 


Creatinine   


 


Est GFR (CKD-EPI)NonAf   


 


POC Glucose (mg/dL)   


 


Total Protein  6.2 L  


 


Procalcitonin   0.11 H 














  06/21/22 06/21/22





  05:34 19:47


 


WBC  


 


Lymphocytes #  


 


BUN  


 


Creatinine  


 


Est GFR (CKD-EPI)NonAf  56.4 L 


 


POC Glucose (mg/dL)   131 H


 


Total Protein  


 


Procalcitonin  














Assessment and Plan


Assessment: 





Encephalopathy of unknown etiology.  It was suspected he has ?pneumonia (per 

Primary team).  Possible as result of his advanced Dementia from parkinson.  

Rule out seizure


Parkinson's disease


REM sleep behavior disorder


Dementia 





Plan: 


I ordered EEG.  I'll not start the patient on antiepileptic drugs unless there 

is triple discharges or seizure on the EEG.


Ordered TSH, vitamin B12, folate and ammonia level.


Q4 hour neuro checks.


Will defer the rest of management to primary team.


Upon discharge, recommend patient to follow-up with his general neurologist (Dr. Wade) within 1-2 weeks.





The plan is discussed with patient's son and his wife as well as primary team.


Thank you for the consultation.





Walt Alfaro M.D.


Neuro-Hospitalist








Time with Patient: Greater than 30

## 2022-06-22 NOTE — EEG
ELECTROENCEPHALOGRAM REPORT



DATE OF SERVICE:

06/22/2022.



CLINICAL HISTORY:

This is an 82-year-old gentleman with history of Parkinson's, dementia, REM 
sleep

behavior disorder who has altered mental status.  The video EEG is obtained to 
evaluate

for seizure epileptiform activity.



RELEVANT MEDICATION:

The patient is not on any antiepileptic drugs.



EEG TYPE:

A routine 21 channel EEG performed with video using the 10/20 electrode 
placement

system.



DESCRIPTION:

Wakefulness and drowsiness are obtained.  During awake state, the background 
consists

of low to moderate voltage of 5 to 6 hertz activity.  There is no physiological 
stage

2 sleep architecture.



There is no focal slowing.



Interictal and ictal is none.



ACTIVATION PROCEDURE:

Photic stimulation and hyperventilation is not performed.



CLINICAL INTERPRETATION:

This is an abnormal routine EEG.  The background slowing is suggestive of mild 
to

moderate encephalopathy.  Otherwise, there is no focal slowing, epileptiform 
discharges

or seizure on the EEG.  Clinical correlation is recommended.





DALTON / BENIGNO: [id number] / Job#: [id number]

MTDD

## 2022-06-23 LAB
ALBUMIN SERPL-MCNC: 3.3 G/DL (ref 3.5–5)
ALP SERPL-CCNC: 56 U/L (ref 38–126)
ALT SERPL-CCNC: 7 U/L (ref 4–49)
ANION GAP SERPL CALC-SCNC: 7 MMOL/L
AST SERPL-CCNC: 27 U/L (ref 17–59)
BASOPHILS # BLD AUTO: 0.1 K/UL (ref 0–0.2)
BASOPHILS NFR BLD AUTO: 1 %
BILIRUB INDIRECT SERPL-MCNC: 0.2 MG/DL (ref 0–1.1)
BILIRUBIN DIRECT+TOT PNL SERPL-MCNC: 0.2 MG/DL (ref 0–0.2)
BUN SERPL-SCNC: 45 MG/DL (ref 9–20)
CALCIUM SPEC-MCNC: 8.2 MG/DL (ref 8.4–10.2)
CHLORIDE SERPL-SCNC: 108 MMOL/L (ref 98–107)
CO2 SERPL-SCNC: 26 MMOL/L (ref 22–30)
EOSINOPHIL # BLD AUTO: 0.1 K/UL (ref 0–0.7)
EOSINOPHIL NFR BLD AUTO: 1 %
ERYTHROCYTE [DISTWIDTH] IN BLOOD BY AUTOMATED COUNT: 4.54 M/UL (ref 4.3–5.9)
ERYTHROCYTE [DISTWIDTH] IN BLOOD: 12.8 % (ref 11.5–15.5)
GLUCOSE BLD-MCNC: 100 MG/DL (ref 70–110)
GLUCOSE BLD-MCNC: 112 MG/DL (ref 70–110)
GLUCOSE BLD-MCNC: 112 MG/DL (ref 70–110)
GLUCOSE BLD-MCNC: 118 MG/DL (ref 70–110)
GLUCOSE SERPL-MCNC: 122 MG/DL (ref 74–99)
HCT VFR BLD AUTO: 41.6 % (ref 39–53)
HGB BLD-MCNC: 13.6 GM/DL (ref 13–17.5)
LYMPHOCYTES # SPEC AUTO: 0.6 K/UL (ref 1–4.8)
LYMPHOCYTES NFR SPEC AUTO: 11 %
MAGNESIUM SPEC-SCNC: 2.1 MG/DL (ref 1.6–2.3)
MCH RBC QN AUTO: 30 PG (ref 25–35)
MCHC RBC AUTO-ENTMCNC: 32.7 G/DL (ref 31–37)
MCV RBC AUTO: 91.7 FL (ref 80–100)
MONOCYTES # BLD AUTO: 0.6 K/UL (ref 0–1)
MONOCYTES NFR BLD AUTO: 10 %
NEUTROPHILS # BLD AUTO: 4.4 K/UL (ref 1.3–7.7)
NEUTROPHILS NFR BLD AUTO: 76 %
PLATELET # BLD AUTO: 184 K/UL (ref 150–450)
POTASSIUM SERPL-SCNC: 4.1 MMOL/L (ref 3.5–5.1)
PROT SERPL-MCNC: 6 G/DL (ref 6.3–8.2)
SODIUM SERPL-SCNC: 141 MMOL/L (ref 137–145)
WBC # BLD AUTO: 5.9 K/UL (ref 3.8–10.6)

## 2022-06-23 RX ADMIN — ACETAMINOPHEN PRN MG: 325 TABLET, FILM COATED ORAL at 16:48

## 2022-06-23 RX ADMIN — BUDESONIDE SCH MG: 1 SUSPENSION RESPIRATORY (INHALATION) at 20:17

## 2022-06-23 RX ADMIN — IPRATROPIUM BROMIDE AND ALBUTEROL SULFATE SCH ML: .5; 3 SOLUTION RESPIRATORY (INHALATION) at 20:18

## 2022-06-23 RX ADMIN — TAMSULOSIN HYDROCHLORIDE SCH MG: 0.4 CAPSULE ORAL at 20:19

## 2022-06-23 RX ADMIN — CARBIDOPA AND LEVODOPA SCH EACH: 25; 100 TABLET ORAL at 09:02

## 2022-06-23 RX ADMIN — FAMOTIDINE SCH MG: 20 TABLET, FILM COATED ORAL at 09:02

## 2022-06-23 RX ADMIN — PIPERACILLIN AND TAZOBACTAM SCH MLS/HR: 3; .375 INJECTION, POWDER, FOR SOLUTION INTRAVENOUS at 16:48

## 2022-06-23 RX ADMIN — DEXTROSE MONOHYDRATE AND SODIUM CHLORIDE SCH: 5; .9 INJECTION, SOLUTION INTRAVENOUS at 17:29

## 2022-06-23 RX ADMIN — Medication SCH MG: at 20:19

## 2022-06-23 RX ADMIN — DONEPEZIL HYDROCHLORIDE SCH MG: 10 TABLET ORAL at 09:02

## 2022-06-23 RX ADMIN — BUDESONIDE SCH: 1 SUSPENSION RESPIRATORY (INHALATION) at 09:27

## 2022-06-23 RX ADMIN — PIPERACILLIN AND TAZOBACTAM SCH MLS/HR: 3; .375 INJECTION, POWDER, FOR SOLUTION INTRAVENOUS at 10:16

## 2022-06-23 RX ADMIN — MEMANTINE HYDROCHLORIDE SCH MG: 10 TABLET ORAL at 09:02

## 2022-06-23 RX ADMIN — TIMOLOL MALEATE SCH DROPS: 5 SOLUTION OPHTHALMIC at 09:07

## 2022-06-23 RX ADMIN — MEMANTINE HYDROCHLORIDE SCH MG: 10 TABLET ORAL at 20:20

## 2022-06-23 RX ADMIN — HEPARIN SODIUM SCH UNIT: 5000 INJECTION INTRAVENOUS; SUBCUTANEOUS at 09:07

## 2022-06-23 RX ADMIN — IPRATROPIUM BROMIDE AND ALBUTEROL SULFATE SCH ML: .5; 3 SOLUTION RESPIRATORY (INHALATION) at 16:12

## 2022-06-23 RX ADMIN — LATANOPROST SCH DROPS: 50 SOLUTION OPHTHALMIC at 20:21

## 2022-06-23 RX ADMIN — IPRATROPIUM BROMIDE AND ALBUTEROL SULFATE SCH ML: .5; 3 SOLUTION RESPIRATORY (INHALATION) at 12:23

## 2022-06-23 RX ADMIN — Medication SCH MCG: at 09:02

## 2022-06-23 RX ADMIN — HEPARIN SODIUM SCH UNIT: 5000 INJECTION INTRAVENOUS; SUBCUTANEOUS at 20:21

## 2022-06-23 RX ADMIN — IPRATROPIUM BROMIDE AND ALBUTEROL SULFATE SCH: .5; 3 SOLUTION RESPIRATORY (INHALATION) at 09:27

## 2022-06-23 RX ADMIN — PIPERACILLIN AND TAZOBACTAM SCH MLS/HR: 3; .375 INJECTION, POWDER, FOR SOLUTION INTRAVENOUS at 02:56

## 2022-06-23 NOTE — CT
EXAMINATION TYPE: CT abdomen pelvis wo con

CT DLP: 650.5 mGycm, Automated exposure control for dose reduction was used.

 

DATE OF EXAM: 6/23/2022 9:44 PM

 

COMPARISON: None.

CLINICAL INDICATION:Male, 82 years old with history of per physician order, abd pain; abdominal pain

 

TECHNIQUE:  Standard  CT of the abdomen and pelvis without IV or oral contrast. Lack of IV or oral co
ntrast limits evaluation of solid and hollow organ viscera. Coronal and sagittal reformats were perfo
rmed. 

 

FINDINGS: 

LOWER CHEST: Left opacities seen in the bilateral lung bases, right greater than left. No confluent c
onsolidation or pleural effusion. Mild pectus excavatum.

 

ABDOMEN

LIVER: Unremarkable

GALLBLADDER AND BILE DUCTS: Unremarkable.

PANCREAS: Unremarkable.

SPLEEN: Unremarkable.

ADRENAL GLANDS: Unremarkable.

KIDNEYS AND URETERS: No evidence of hydronephrosis or renal calculus. The ureters are unremarkable. B
ilateral renal cysts measuring up to 0.0 cm on the left.

 

PELVIS:

BLADDER: Nondistended with Duarte catheter in place.

REPRODUCTIVE: Prostate is surgically absent.

 

ABDOMEN & PELVIS

STOMACH AND BOWEL: Scattered diverticula are noted throughout the colon. No evidence of bowel obstruc
tion. Large stool burden within the rectum measuring up to 7.4 cm in transverse dimension.

PERITONEUM: No evidence of pneumoperitoneum or free fluid.

 

VASCULATURE: Mild atherosclerotic calcifications are present throughout the abdominal aorta and its b
ranches. Fusiform infrarenal aortic aneurysm measuring up to 3.0 cm.

MUSCULOSKELETAL: Mild disc degeneration changes are present throughout the thoracolumbar spine..

LYMPH NODES: No gross evidence for lymphadenopathy.

SOFT TISSUE/ABDOMINAL WALL: Left fat filled inguinal hernia.

 

IMPRESSION:

1.  Bibasilar posterior lower lobe ground glass opacities concerning for aspiration/pneumonia.

2.  Infrarenal fusiform aortic aneurysm measuring up to 3.0 cm.

3.  Large stool burden within the rectum.

4.  Colonic diverticulosis.

5.  Surgically absent prostate without evidence for suspicious osseous lesion or lymphadenopathy.

## 2022-06-23 NOTE — P.PN
Subjective





This is a pleasant 82 minute with past medical history of : Dementia, Pneumonia,

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


Presents because of cough and subjective fever and generalized weakness.  

Patient somewhat disappointed historian and information was obtained with the 

help of his son they'll at bedside.  Family stated that patient has been 

complaining of from cough as well as his wife, so they took him to the clinic [place] which referred him to the hospital for his weakness.  As per son patient 

has been having this cough and for the last 3 days and it is associated in the 

beginning with sore throat.  Also he had some runny nose but the son states that

this is a chronic.  Currently no sore throat, no chest pain or dyspnea at rest. 

Patient denies fever.


No diarrhea or vomiting


Her family stated that patient have difficulty getting up from chair or walking,

he started using her walker the last few days 


Also patient looks confused.  The family also does not look much concerned 

because this been happening up and down.  Patient knows he is in the hospital 

but could not tell which hospital or sitting, he is disoriented to time and 

person.  He thought his son they'll at bedside is his brother Walter.  Patient also

has no insight into his illness


Patient also has history of Parkinson disease for many years, he has mild 

resting tremor today.


Patient also complaining of from difficulty passing urine but no dysuria.


No smoking, alcohol or illicit tracts





Patient is hemodynamically stable.  He is afebrile.


Labs including CBC showed only mild lymphopenia, BMP showing mildly elevated 

creatinine 1.29 compared to baseline of 1.1.


Rest of BMP and liver enzymes are unremarkable.  Troponin is negative less than 

0.012.  ProBNP is negative at 325.  Urinalysis is negative.


Coronavirus and influenza viruses are undetected


EKG showing junctional rhythm


While chest x-ray showing congestive heart failure with small pleural effusion 

as per report 


On admission patient received 1 dose of ceftriaxone.  He was started on





06/21/2022


Patient today still confused, he has difficulty holding stop because of his 

worsening tremors in both upper and lower extremities as well as his head.  It 

looks more stiff.  This could be related to his worsening Parkinson before we 

going to consult neurology service.


Repeat chest x-ray showed clearing of pulmonary congestion and interstitial i

nfiltrates per radiologist.


Echocardiogram showing preserved ejection fraction 55-60%, CT of the brain is 

negative for acute process.


Chest x-ray showed clearing of pulmonary congestion with interstitial 

infiltrates improved radiologist.  Poorcalcitonin is not significantly elevated

and it is only at 0.11.


No fever.  No significant tachycardia or tachypnea.


His medication.  He was started on Solu-Medrol and antibiotics changed to Zosyn.




Also patient will be transferred to


Discussed with the son at bedside.  





06/22/2022


I assessed the patient twice first occurred in the morning and later on together

with the neurologist, and both occasions the Sunday was at bedside.  Patient 

today is more confused, he does not follow commands or answer questions, he has 

tremor and abnormal jerking movements of both of upper and lower extremities 

especially at rest but more patient tried to move or patient provoked.


Patient withdrawal to painful stimuli.  No abnormal posters.  Patient paid 

attention and opens eyes when call his name.  Neurologist on the case and 

recommended EEG which shows no seizure-like activity.  There is mild-to-moderate

encephalopathy.


Repeat his x-ray showing no consolidation and was reviewed by myself.


Patient remains on Zosyn and we will repeatcalcitonin also we will repeat other

labs.  Repeat CBC, BMP magnesium, liver enzymes and poorcalcitonin.


B12, hemoglobin A1c, folic acid and TSH were unremarkable.


Echocardiogram showed ejection fraction of 55-60%.


Suspicion of infection is low however patient tripped on antibiotic.  

Possibility patient has last dementia and Parkinson disease, also with may be 

affected by medication.  Therefore steroids was discontinued today.  Patient on 

high-dose of donpezil , memantine and Sinemet.








06/23/2022


Patient is more calm today, less confused, he is a sleepy and wakes up to verbal

stimuli, he answers questions and follow commands more appropriately compared to

yesterday.


He is forgetful, he could not recognize his son at bedside


No evidence of cellulitis, he has some mild abdominal tenderness especially on 

the right side short of its significance however we going to recommend computed 

tomography scan of the abdomen with oral contrast.


Also his creatinine went up to 1.6 from 1.2.  We lowered his IV fluids D5 normal

saline down to 50 mL per hour.  Also will check bladder scan was more than 300 

with evidence of ureteral retention.  We start Flomax and consider straight cath

versus Duarte catheter


Discussed with family to encourage reorientation to the patient reacted


He is eating well and tolerates diet, he finished 50-75% of his diet today.


Neurology input is appreciated, Sinemet was discontinued.  Steroids was 

discontinued also from yesterday which might contribute to patient clinical 

picture.  Patient was started on melatonin and small dose of Klonopin and we 

will keep monitoring





Objective





- Vital Signs


Vital signs: 


                                   Vital Signs











Temp  98.0 F   06/23/22 08:56


 


Pulse  70   06/23/22 08:56


 


Resp  18   06/23/22 08:56


 


BP  106/67   06/23/22 08:56


 


Pulse Ox  91 L  06/23/22 08:56


 


FiO2      








                                 Intake & Output











 06/22/22 06/23/22 06/23/22





 18:59 06:59 18:59


 


Intake Total 240 540 


 


Output Total 451 2 


 


Balance -211 538 


 


Intake:   


 


  Oral 240 540 


 


Output:   


 


  Urine 450  


 


  Stool 1 2 


 


Other:   


 


  Voiding Method  Diaper Diaper





  Incontinent Incontinent


 


  # Voids 1  


 


  # Bowel Movements  1 














- Exam








-GENERAL: The patient is confused, sleepy but awakes to verbal stimuli, he does 

follow commands today, not in any acute distress.  under developed and 

generalized weakness


HEENT: Pupils are round and equally reacting to light. EOMI. No scleral icterus.

No conjunctival pallor. Normocephalic, atraumatic. No pharyngeal erythema. No 

thyromegaly. 


CARDIOVASCULAR: S1 and S2 present. No murmurs, rubs, or gallops. 


PULMONARY: Chest is clear to auscultation, no wheezing or crackles.  


-ABDOMEN: Soft, mild right abdominal tenderness, no rebound tenderness, no 

guarding, nondistended, normoactive bowel sounds. No palpable organomegaly. 


MUSCULOSKELETAL: No joint swelling or deformity. 


EXTREMITIES: No cyanosis, clubbing, or pedal edema. 


-NEUROLOGICAL: Gross neurological examination did not reveal any focal deficits.

 Patient is confused, he does not follows commands.  Cranial nerves are grossly 

intact.  Meningeal signs are absent  He has some generalized weakness but he de

nies any numbness


SKIN: No rashes. No petechiae





- Labs


CBC & Chem 7: 


                                 06/23/22 09:38





                                 06/23/22 09:38


Labs: 


                  Abnormal Lab Results - Last 24 Hours (Table)











  06/21/22 06/22/22 06/22/22 Range/Units





  05:34 11:35 16:23 


 


Lymphocytes #     (1.0-4.8)  k/uL


 


Chloride     ()  mmol/L


 


BUN     (9-20)  mg/dL


 


Creatinine     (0.66-1.25)  mg/dL


 


Glucose     (74-99)  mg/dL


 


POC Glucose (mg/dL)   125 H  119 H  ()  mg/dL


 


Plasma Lactic Acid Nikhil     (0.7-2.0)  mmol/L


 


Calcium     (8.4-10.2)  mg/dL


 


Total Protein     (6.3-8.2)  g/dL


 


Albumin     (3.5-5.0)  g/dL


 


Vitamin B12  1290.0 H    (200.0-944.0)  pg/mL


 


Procalcitonin     (0.02-0.09)  ng/mL














  06/22/22 06/22/22 06/22/22 Range/Units





  19:30 19:30 19:30 


 


Lymphocytes #  0.5 L    (1.0-4.8)  k/uL


 


Chloride     ()  mmol/L


 


BUN     (9-20)  mg/dL


 


Creatinine     (0.66-1.25)  mg/dL


 


Glucose     (74-99)  mg/dL


 


POC Glucose (mg/dL)     ()  mg/dL


 


Plasma Lactic Acid Nikhil   2.4 H*   (0.7-2.0)  mmol/L


 


Calcium     (8.4-10.2)  mg/dL


 


Total Protein     (6.3-8.2)  g/dL


 


Albumin     (3.5-5.0)  g/dL


 


Vitamin B12     (200.0-944.0)  pg/mL


 


Procalcitonin    0.31 H  (0.02-0.09)  ng/mL














  06/22/22 06/23/22 06/23/22 Range/Units





  21:21 05:49 09:38 


 


Lymphocytes #    0.6 L  (1.0-4.8)  k/uL


 


Chloride     ()  mmol/L


 


BUN     (9-20)  mg/dL


 


Creatinine     (0.66-1.25)  mg/dL


 


Glucose     (74-99)  mg/dL


 


POC Glucose (mg/dL)  127 H  112 H   ()  mg/dL


 


Plasma Lactic Acid Nikhil     (0.7-2.0)  mmol/L


 


Calcium     (8.4-10.2)  mg/dL


 


Total Protein     (6.3-8.2)  g/dL


 


Albumin     (3.5-5.0)  g/dL


 


Vitamin B12     (200.0-944.0)  pg/mL


 


Procalcitonin     (0.02-0.09)  ng/mL














  06/23/22 Range/Units





  09:38 


 


Lymphocytes #   (1.0-4.8)  k/uL


 


Chloride  108 H  ()  mmol/L


 


BUN  45 H  (9-20)  mg/dL


 


Creatinine  1.64 H  (0.66-1.25)  mg/dL


 


Glucose  122 H  (74-99)  mg/dL


 


POC Glucose (mg/dL)   ()  mg/dL


 


Plasma Lactic Acid Nikhil   (0.7-2.0)  mmol/L


 


Calcium  8.2 L  (8.4-10.2)  mg/dL


 


Total Protein  6.0 L  (6.3-8.2)  g/dL


 


Albumin  3.3 L  (3.5-5.0)  g/dL


 


Vitamin B12   (200.0-944.0)  pg/mL


 


Procalcitonin   (0.02-0.09)  ng/mL








                      Microbiology - Last 24 Hours (Table)











 06/21/22 17:32 Blood Culture - Preliminary





 Blood    No Growth after 24 hours














Assessment and Plan


Assessment: 








Altered mental status most likely metabolic and more toxic encephalopathy, the 

top of advanced dementia.  Patient condition worsened with steroids as well as 

possible sentiment effect


Worsening tremor most likely related to Parkinson disease


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


Bilateral respiratory infiltrate suspicious for community acquired pneumonia.  

Improved.  Suspicion of pneumonia is low, not entirely excluded.  


Urinary retention


Mild abdominal pain, rule out intra-abdominal pathology


Generalized weakness and deconditioning


Mild acute kidney injury and dehydration, improved


Alzheimer dementia


History of Parkinson disease


History of prostate disease status post surgical removal of the prostate


History of pneumonia











Plan: 





This is a pleasant 82 old male who presents with pneumonia and metabolic 

encephalopathy


Continuous with  Zosyn 


Patient currently kept off his steroids.  Also sentiment was discontinued by 

neurologist


Patient is started on melatonin and clonidine


Neurology consult


Duarte catheter placement and Flomax


CT of the abdomen and pelvis with oral contrast, no IV contrast


labs and medication were reviewed..  Continue same treatment.  Continue with 

symptomatic treatment.  Resume home medication.  Monitor lytes and vitals.  DVT 

and GI prophylaxis.  Further recommendations depends on the clinical course of 

the patient


DVT prophylaxis: Subcutaneous heparin


GI Prophylaxis: Pepcid


PT/OT: Recommended subacute rehab,  consulted


Prognosis is guarded

## 2022-06-23 NOTE — P.PN
Subjective


Progress Note Date: 06/23/22


The patient is seen at bedside and is accompanied by his son.  The son does not 

know the details of patient history/management, he called the patient's wife who

always able to speak with her.  She could not give me exactly the details of 

patient's history since she felt it happened long time ago.  She stated that the

patient had REM sleep behavioral disorder and is seems was diagnosed many years 

ago unsure exactly when and patient had a sleep study done by Dr. Asencio and he 

felt was worse.  Then that she noticed that the patient has been having the 

memory loss and she stated that it was going on for years and was mostly short-

term memory and progressively getting worse.  He was having severe REM be

havioral at night with violent motor movement.  She did not feel he had resting 

tremor but son felt he had tremor while feeding self.  It seems that he was 

diagnosed with Parkinson's disease and was started on Sinemet she stated at 

least 3 months but to be more.  She felt the patient has visual hallucination 

while on Sinemet.  But he had the very severe visual hallucination while on 

steroids in the past and the son thinks it could've been a year ago.  Patient 

was still able to have a conversation and walk and they felt he was doing well. 

He was not being followed up with the pulmonologist as an outpatient but rather 

was being followed up with the Dr. Wade (Neurologist) but is seems the patient 

was never started on melatonin or Clonazepam for his REM according to wife.  She

thinks he was not started on any medications.  





Until this past weekend and which is seems to the wife had upper respiratory 

tract infection and then the patient had cough generalized weakness and then he 

went to an urgent care and they're concerned about a stroke because the wife 

stated that he is having difficulty picking up his feet but the son stated that 

was generalized weakness.  During this hospital visit is seems the patient was 

given steroids and worsened drastically with confusion and tremors.  But today 

the son feels patient is doing much better compared to initial presentations but

not back to baseline.











Objective





- Vital Signs


Vital signs: 


                                   Vital Signs











Temp  98.0 F   06/23/22 08:56


 


Pulse  70   06/23/22 08:56


 


Resp  18   06/23/22 08:56


 


BP  106/67   06/23/22 08:56


 


Pulse Ox  91 L  06/23/22 08:56


 


FiO2      








                                 Intake & Output











 06/22/22 06/23/22 06/23/22





 18:59 06:59 18:59


 


Intake Total 240 540 


 


Output Total 451 2 


 


Balance -211 538 


 


Intake:   


 


  Oral 240 540 


 


Output:   


 


  Urine 450  


 


  Stool 1 2 


 


Other:   


 


  Voiding Method  Diaper Diaper





  Incontinent Incontinent


 


  # Voids 1  


 


  # Bowel Movements  1 














- Exam


GENERAL: The patient is lying in bed and does not appear in acute distress.





NEUROLOGICAL:


Higher mental function: The patient is drowsy but is awakeable to voice.  He was

able to follow simple commands.  , 


Cranial nerves: The pupils are round, equal and reactive to light .  No facial 

weakness.    


Motor: The strength is hard to assess but was moving all extremities 

spontaneously.  Has transient episode of tremors of his body.  Normal tone.  








Some of the workup during this admission in our facility consisted of:


Vitamin B12: 1290


TSH: 1.980


Ammonia <9


Urinalysis is negative for urinary tract infection.


Corona virus PCR was not detected.  Influenza A and B is not detected.


CT of the head is reported as no acute intracranial hemorrhage or gross acute 

cortical infarct.  Brain volume loss changes and suspected chronic microvascular

ischemic changes with other chronic an incidental finding.


I personally reviewed the CT of the head and I agree there is no acute or 

subacute ischemic and there is no truncal hemorrhage.


Routine EEG is abnormal.  The back of slowing suggestive of mild to moderate 

encephalopathy.  Otherwise there is no focal slowing, epileptiform discharges or

seizure on the EEG.











- Labs


CBC & Chem 7: 


                                 06/22/22 19:30





                                 06/21/22 05:34


Labs: 


                  Abnormal Lab Results - Last 24 Hours (Table)











  06/21/22 06/22/22 06/22/22 Range/Units





  05:34 11:35 16:23 


 


Lymphocytes #     (1.0-4.8)  k/uL


 


POC Glucose (mg/dL)   125 H  119 H  ()  mg/dL


 


Plasma Lactic Acid Nikhil     (0.7-2.0)  mmol/L


 


Vitamin B12  1290.0 H    (200.0-944.0)  pg/mL


 


Procalcitonin     (0.02-0.09)  ng/mL














  06/22/22 06/22/22 06/22/22 Range/Units





  19:30 19:30 19:30 


 


Lymphocytes #  0.5 L    (1.0-4.8)  k/uL


 


POC Glucose (mg/dL)     ()  mg/dL


 


Plasma Lactic Acid Nikhil   2.4 H*   (0.7-2.0)  mmol/L


 


Vitamin B12     (200.0-944.0)  pg/mL


 


Procalcitonin    0.31 H  (0.02-0.09)  ng/mL














  06/22/22 06/23/22 Range/Units





  21:21 05:49 


 


Lymphocytes #    (1.0-4.8)  k/uL


 


POC Glucose (mg/dL)  127 H  112 H  ()  mg/dL


 


Plasma Lactic Acid Nikhil    (0.7-2.0)  mmol/L


 


Vitamin B12    (200.0-944.0)  pg/mL


 


Procalcitonin    (0.02-0.09)  ng/mL








                      Microbiology - Last 24 Hours (Table)











 06/21/22 17:32 Blood Culture - Preliminary





 Blood    No Growth after 24 hours














Assessment and Plan


Assessment: 





Encephalopathy likely due to medication effect (Steroids)--improving


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 

patient initially had dementia then parkinson's symptoms and has visual 

hallucination to dopaminergic medications)


REM sleep behavior disorder


Likely Acute Viral Upper Respiratory infection 





Plan: 





I stopped the Sinemet since patient having visual hallucination with medication 

(Lewy body dementia patient are sensitive to dopaminergic medication).


I started the patient on Melatonin 3mg qhs and Clonazepam 0.25mg qhs and that 

can be titrated down the line if needed for his REM behavioral disorder.


Please avoid any steroids, narcotic oral opiates that affect the patient's 

mentation


Ask for the family to attempt to give more accurate time frame of patient's 

symptoms.


Q4 hour neuro checks.


Will defer the rest of management to primary team.


Upon discharge, recommend patient to follow-up with a neurologist within 1-2 

weeks.





The plan is discussed with patient's son and patient's wife as well as primary 

team in length.





Walt Alfaro M.D.


Neuro-Hospitalist








Time with Patient: Greater than 30

## 2022-06-24 LAB
ALBUMIN SERPL-MCNC: 3.5 G/DL (ref 3.5–5)
ALP SERPL-CCNC: 59 U/L (ref 38–126)
ALT SERPL-CCNC: 22 U/L (ref 4–49)
ANION GAP SERPL CALC-SCNC: 8 MMOL/L
AST SERPL-CCNC: 36 U/L (ref 17–59)
BILIRUB INDIRECT SERPL-MCNC: 0.2 MG/DL (ref 0–1.1)
BILIRUBIN DIRECT+TOT PNL SERPL-MCNC: 0.2 MG/DL (ref 0–0.2)
BUN SERPL-SCNC: 38 MG/DL (ref 9–20)
CALCIUM SPEC-MCNC: 8.5 MG/DL (ref 8.4–10.2)
CHLORIDE SERPL-SCNC: 106 MMOL/L (ref 98–107)
CO2 SERPL-SCNC: 25 MMOL/L (ref 22–30)
GLUCOSE BLD-MCNC: 102 MG/DL (ref 70–110)
GLUCOSE SERPL-MCNC: 156 MG/DL (ref 74–99)
MAGNESIUM SPEC-SCNC: 2.1 MG/DL (ref 1.6–2.3)
POTASSIUM SERPL-SCNC: 4.1 MMOL/L (ref 3.5–5.1)
PROT SERPL-MCNC: 6.4 G/DL (ref 6.3–8.2)
SODIUM SERPL-SCNC: 139 MMOL/L (ref 137–145)

## 2022-06-24 RX ADMIN — BUDESONIDE SCH MG: 1 SUSPENSION RESPIRATORY (INHALATION) at 19:45

## 2022-06-24 RX ADMIN — MEMANTINE HYDROCHLORIDE SCH MG: 10 TABLET ORAL at 09:21

## 2022-06-24 RX ADMIN — TAMSULOSIN HYDROCHLORIDE SCH MG: 0.4 CAPSULE ORAL at 17:46

## 2022-06-24 RX ADMIN — PIPERACILLIN AND TAZOBACTAM SCH MLS/HR: 3; .375 INJECTION, POWDER, FOR SOLUTION INTRAVENOUS at 17:46

## 2022-06-24 RX ADMIN — IPRATROPIUM BROMIDE AND ALBUTEROL SULFATE SCH ML: .5; 3 SOLUTION RESPIRATORY (INHALATION) at 07:56

## 2022-06-24 RX ADMIN — Medication SCH MCG: at 09:21

## 2022-06-24 RX ADMIN — MEMANTINE HYDROCHLORIDE SCH MG: 10 TABLET ORAL at 21:27

## 2022-06-24 RX ADMIN — HEPARIN SODIUM SCH UNIT: 5000 INJECTION INTRAVENOUS; SUBCUTANEOUS at 09:21

## 2022-06-24 RX ADMIN — IPRATROPIUM BROMIDE AND ALBUTEROL SULFATE SCH ML: .5; 3 SOLUTION RESPIRATORY (INHALATION) at 11:51

## 2022-06-24 RX ADMIN — FAMOTIDINE SCH MG: 20 TABLET, FILM COATED ORAL at 09:21

## 2022-06-24 RX ADMIN — DEXTROSE MONOHYDRATE AND SODIUM CHLORIDE SCH MLS/HR: 5; .9 INJECTION, SOLUTION INTRAVENOUS at 02:06

## 2022-06-24 RX ADMIN — BUDESONIDE SCH MG: 1 SUSPENSION RESPIRATORY (INHALATION) at 07:56

## 2022-06-24 RX ADMIN — DONEPEZIL HYDROCHLORIDE SCH MG: 10 TABLET ORAL at 09:21

## 2022-06-24 RX ADMIN — PIPERACILLIN AND TAZOBACTAM SCH MLS/HR: 3; .375 INJECTION, POWDER, FOR SOLUTION INTRAVENOUS at 02:06

## 2022-06-24 RX ADMIN — PIPERACILLIN AND TAZOBACTAM SCH MLS/HR: 3; .375 INJECTION, POWDER, FOR SOLUTION INTRAVENOUS at 09:21

## 2022-06-24 RX ADMIN — LATANOPROST SCH DROPS: 50 SOLUTION OPHTHALMIC at 21:27

## 2022-06-24 RX ADMIN — DEXTROSE MONOHYDRATE AND SODIUM CHLORIDE SCH: 5; .9 INJECTION, SOLUTION INTRAVENOUS at 21:28

## 2022-06-24 RX ADMIN — HEPARIN SODIUM SCH UNIT: 5000 INJECTION INTRAVENOUS; SUBCUTANEOUS at 21:27

## 2022-06-24 RX ADMIN — Medication SCH MG: at 21:27

## 2022-06-24 RX ADMIN — TIMOLOL MALEATE SCH DROPS: 5 SOLUTION OPHTHALMIC at 09:22

## 2022-06-24 RX ADMIN — IPRATROPIUM BROMIDE AND ALBUTEROL SULFATE SCH ML: .5; 3 SOLUTION RESPIRATORY (INHALATION) at 19:46

## 2022-06-24 RX ADMIN — IPRATROPIUM BROMIDE AND ALBUTEROL SULFATE SCH ML: .5; 3 SOLUTION RESPIRATORY (INHALATION) at 15:00

## 2022-06-24 NOTE — MR
MR brain without contrast

 

HISTORY: Altered mental status

 

Multiplanar multisequence imaging obtained through the brain

 

Correlation to CT brain 6/20/2022

 

There is no restricted diffusion. Cortical atrophy and white matter demyelination changes are again n
oted as on CT, there is no hemorrhage or hydrocephalus. Sinus disease is present in the maxillary sin
us, sphenoid sinus, ethmoid air cells. Clear, pituitary, cervical medullary junction, cerebellopontin
e angles are unremarkable. Some fluid signal present within the mastoid air cells.

 

IMPRESSION: No acute abnormality. Sinus disease. Age-related changes of atrophy and chronic small ves
richard ischemia.

## 2022-06-24 NOTE — XR
EXAMINATION TYPE: XR chest 1V

 

DATE OF EXAM: 6/24/2022

 

HISTORY: Shortness of breath.

 

COMPARISON: 6/22/2022

 

TECHNIQUE: Single view of the chest is submitted.

 

FINDINGS:

Demonstrated are scattered senescent parenchymal change.  

 

There is no evidence for focal infiltrate. 

 

The heart is stable.

 

Hilar and mediastinal structures are within normal limits.  

 

Degenerative changes are seen of the dorsal spine. 

 

 IMPRESSION: 

 

1.  Chronic changes without evidence for acute pulmonary disease.

## 2022-06-24 NOTE — P.PN
Subjective





This is a pleasant 82 minute with past medical history of : Dementia, Pneumonia,

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


Presents because of cough and subjective fever and generalized weakness.  

Patient somewhat disappointed historian and information was obtained with the 

help of his son they'll at bedside.  Family stated that patient has been 

complaining of from cough as well as his wife, so they took him to the clinic in

[place] which referred him to the hospital for his weakness.  As per son patient 

has been having this cough and for the last 3 days and it is associated in the 

beginning with sore throat.  Also he had some runny nose but the son states that

this is a chronic.  Currently no sore throat, no chest pain or dyspnea at rest. 

Patient denies fever.


No diarrhea or vomiting


Her family stated that patient have difficulty getting up from chair or walking,

he started using her walker the last few days 


Also patient looks confused.  The family also does not look much concerned 

because this been happening up and down.  Patient knows he is in the hospital 

but could not tell which hospital or sitting, he is disoriented to time and 

person.  He thought his son they'll at bedside is his brother Walter.  Patient also

has no insight into his illness


Patient also has history of Parkinson disease for many years, he has mild 

resting tremor today.


Patient also complaining of from difficulty passing urine but no dysuria.


No smoking, alcohol or illicit tracts





Patient is hemodynamically stable.  He is afebrile.


Labs including CBC showed only mild lymphopenia, BMP showing mildly elevated 

creatinine 1.29 compared to baseline of 1.1.


Rest of BMP and liver enzymes are unremarkable.  Troponin is negative less than 

0.012.  ProBNP is negative at 325.  Urinalysis is negative.


Coronavirus and influenza viruses are undetected


EKG showing junctional rhythm


While chest x-ray showing congestive heart failure with small pleural effusion 

as per report 


On admission patient received 1 dose of ceftriaxone.  He was started on





06/21/2022


Patient today still confused, he has difficulty holding stop because of his 

worsening tremors in both upper and lower extremities as well as his head.  It 

looks more stiff.  This could be related to his worsening Parkinson before we 

going to consult neurology service.


Repeat chest x-ray showed clearing of pulmonary congestion and interstitial i

nfiltrates per radiologist.


Echocardiogram showing preserved ejection fraction 55-60%, CT of the brain is 

negative for acute process.


Chest x-ray showed clearing of pulmonary congestion with interstitial 

infiltrates improved radiologist.  Poorcalcitonin is not significantly elevated

and it is only at 0.11.


No fever.  No significant tachycardia or tachypnea.


His medication.  He was started on Solu-Medrol and antibiotics changed to Zosyn.




Also patient will be transferred to


Discussed with the son at bedside.  





06/22/2022


I assessed the patient twice first occurred in the morning and later on together

with the neurologist, and both occasions the Sunday was at bedside.  Patient 

today is more confused, he does not follow commands or answer questions, he has 

tremor and abnormal jerking movements of both of upper and lower extremities 

especially at rest but more patient tried to move or patient provoked.


Patient withdrawal to painful stimuli.  No abnormal posters.  Patient paid 

attention and opens eyes when call his name.  Neurologist on the case and 

recommended EEG which shows no seizure-like activity.  There is mild-to-moderate

encephalopathy.


Repeat his x-ray showing no consolidation and was reviewed by myself.


Patient remains on Zosyn and we will repeatcalcitonin also we will repeat other

labs.  Repeat CBC, BMP magnesium, liver enzymes and poorcalcitonin.


B12, hemoglobin A1c, folic acid and TSH were unremarkable.


Echocardiogram showed ejection fraction of 55-60%.


Suspicion of infection is low however patient tripped on antibiotic.  

Possibility patient has last dementia and Parkinson disease, also with may be 

affected by medication.  Therefore steroids was discontinued today.  Patient on 

high-dose of donpezil , memantine and Sinemet.








06/23/2022


Patient is more calm today, less confused, he is a sleepy and wakes up to verbal

stimuli, he answers questions and follow commands more appropriately compared to

yesterday.


He is forgetful, he could not recognize his son at bedside


No evidence of cellulitis, he has some mild abdominal tenderness especially on 

the right side short of its significance however we going to recommend computed 

tomography scan of the abdomen with oral contrast.


Also his creatinine went up to 1.6 from 1.2.  We lowered his IV fluids D5 normal

saline down to 50 mL per hour.  Also will check bladder scan was more than 300 

with evidence of ureteral retention.  We start Flomax and consider straight cath

versus Duarte catheter


Discussed with family to encourage reorientation to the patient reacted


He is eating well and tolerates diet, he finished 50-75% of his diet today.


Neurology input is appreciated, Sinemet was discontinued.  Steroids was 

discontinued also from yesterday which might contribute to patient clinical 

picture.  Patient was started on melatonin and small dose of Klonopin and we 

will keep monitoring





06/24/2022


Both brothers were at bedside today together with the neurologist while 

examining the patient.  As per brothers yesterday evening patient was more awake

and he had good conversation with brothers, and he couldn't recognize their 

names correctly.  However this morning is again more confused which is goes with

delirium secondary to toxic metabolic encephalopathy.  MRI of the brain is 

negative for acute process or infarct or ischemia.  But showing age-related 

atrophy and chronic small vessel ischemia.


Creatinine actually is trending down today 1.6 down to 1.4.


Chest x-ray showing no acute changes but poorcalcitonin is trending down 0.15 

showing improvement in his infection which is most likely pneumonia seen on CAT 

scan of the abdomen yesterday.


Patient remains on Zosyn.


Patient has evidence of urine retention which is resolved after a pleasant Duarte

catheter.  This may be contributing to altered mental status.  We'll start 

Flomax as well.  Once this might benefit from a urologist as an outpatient.  

Contact information placed in the discharge instructions





Objective





- Vital Signs


Vital signs: 


                                   Vital Signs











Temp  97.9 F   06/24/22 09:11


 


Pulse  75   06/24/22 15:10


 


Resp  18   06/24/22 15:03


 


BP  125/70   06/24/22 12:00


 


Pulse Ox  96   06/24/22 12:00


 


FiO2  21   06/24/22 08:01








                                 Intake & Output











 06/23/22 06/24/22 06/24/22





 18:59 06:59 18:59


 


Intake Total 340  100


 


Output Total 775 1202 925


 


Balance -435 -1215 -978


 


Intake:   


 


  Intake, IV Titration   100





  Amount   


 


    Piperacillin-Tazobactam 3   100





    .375 gm In Sodium   





    Chloride 0.9% 100 ml @ 25   





    mls/hr IVPB Q8H [facility] Rx#:   





    [id number]   


 


  Oral 340  


 


Output:   


 


  Urine 775 1200 925


 


    Uretheral ([place])   475


 


  Stool  2 


 


Other:   


 


  Voiding Method Diaper Diaper Diaper





 Incontinent Incontinent Incontinent


 


  # Bowel Movements  1 














- Exam








-GENERAL: The patient is confused, sleepy but awakes to verbal stimuli, he does 

follow commands today, not in any acute distress.  under developed and 

generalized weakness


HEENT: Pupils are round and equally reacting to light. EOMI. No scleral icterus.

No conjunctival pallor. Normocephalic, atraumatic. No pharyngeal erythema. No 

thyromegaly. 


CARDIOVASCULAR: S1 and S2 present. No murmurs, rubs, or gallops. 


PULMONARY: Chest is clear to auscultation, no wheezing or crackles.  


-ABDOMEN: Soft, mild right abdominal tenderness, no rebound tenderness, no 

guarding, nondistended, normoactive bowel sounds. No palpable organomegaly. 


MUSCULOSKELETAL: No joint swelling or deformity. 


EXTREMITIES: No cyanosis, clubbing, or pedal edema. 


-NEUROLOGICAL: Gross neurological examination did not reveal any focal deficits.

 Patient is confused, he does not follows commands.  Cranial nerves are grossly 

intact.  Meningeal signs are absent  He has some generalized weakness but he 

denies any numbness


SKIN: No rashes. No petechiae





- Labs


CBC & Chem 7: 


                                 06/23/22 09:38





                                 06/24/22 09:06


Labs: 


                  Abnormal Lab Results - Last 24 Hours (Table)











  06/23/22 06/24/22 06/24/22 Range/Units





  20:48 09:06 09:06 


 


BUN    38 H  (9-20)  mg/dL


 


Creatinine    1.48 H  (0.66-1.25)  mg/dL


 


Glucose    156 H  (74-99)  mg/dL


 


POC Glucose (mg/dL)  112 H    ()  mg/dL


 


Procalcitonin   0.15 H   (0.02-0.09)  ng/mL








                      Microbiology - Last 24 Hours (Table)











 06/21/22 17:32 Blood Culture - Preliminary





 Blood    No Growth after 48 hours














Assessment and Plan


Assessment: 








Altered mental status most likely metabolic and more toxic encephalopathy,on  

the top of advanced dementia.  Patient condition worsened with steroids as well 

as possible sentiment effect


Worsening tremor most likely related to Parkinson disease


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  Combination with vascular 

dementia given his chronic ischemic changes seen on the MRI of the brain


Bilateral respiratory infiltrate suspicious for aspiration pneumonia.  Improved.




Urinary retention, status post Duarte catheter


Mild abdominal pain, rule out intra-abdominal pathology


Generalized weakness and deconditioning


Mild acute kidney injury and dehydration, improved


Alzheimer dementia


History of Parkinson disease


History of prostate disease status post surgical removal of the prostate


History of pneumonia




















Plan: 





This is a pleasant 82 old male who presents with pneumonia and metabolic 

encephalopathy


Continuous with  Zosyn 


Patient currently kept off his steroids.  Also sentiment was discontinued by 

neurologist


Patient is started on melatonin and clonidine


Neurology consult


[place]e catheter placement and Flomax


CT of the abdomen and pelvis with oral contrast, no IV contrast


Cigarette was discontinued and patient placed on melatonin and Klonopin per 

primary team


labs and medication were reviewed..  Continue same treatment.  Continue with 

symptomatic treatment.  Resume home medication.  Monitor lytes and vitals.  DVT 

and GI prophylaxis.  Further recommendations depends on the clinical course of 

the patient


DVT prophylaxis: Subcutaneous heparin


GI Prophylaxis: Pepcid


PT/OT: Recommended subacute rehab,  consulted


Prognosis is guarded

## 2022-06-24 NOTE — P.PN
Subjective


Progress Note Date: 06/24/22


The patient is seen at bedside is accompanied by his 2 sons.  His 2 sons feel 

like the patient has been showing improvement from initial presentation but 

noticed a drastic improvement yesterday in afternoon compared to today.  One of 

his son stated that the patient was talking more appropriately and seemed more 

intact and somewhat close to baseline but felt worse today compared to yesterday

and had visual hallucination.





It seems that the patient had a sleep study and 10/17/2018 and was read by Dr. Bai and it's reported as mild obstructive sleep apnea, positional.  REM 

behavioral disorder this occurred on 2 separate occasions throughout the night. 

Abnormal sleep architecture with overrepresentation of stage I sleep, diminished

delta waves and stage II.  Hypersomnia and excessive sleepiness.  In the 

recommendation, was reported that this condition is typically associate with a 

central neurological disease in regards to the REM behavioral disorder.  

Including Parkinson disease, Lewy body dementia and multi-system atrophy.  And 

that the patient does not have any these symptoms right now.  And however this 

is something to watch for in the future.  It seems the patient is becoming more 

forgetful and currently is under investigation of dementia.  He recommended 

clonazepam and melatonin.





I reviewed Dr. Wade notes that family provided.  It seems patient has been 

following-up with him since 2018.  He has had two MRI Brain which showed white 

matter changes, had EEG in past and reported as no seizure.  For Dementia has on

Aricept and Memantine.   It does mention that patient was referred to him by Dr. Sim for REM behavior.











Objective





- Vital Signs


Vital signs: 


                                   Vital Signs











Temp  97.9 F   06/24/22 09:11


 


Pulse  80   06/24/22 12:08


 


Resp  18   06/24/22 10:03


 


BP  107/64   06/24/22 09:11


 


Pulse Ox  95   06/24/22 09:11


 


FiO2  21   06/24/22 08:01








                                 Intake & Output











 06/23/22 06/24/22 06/24/22





 18:59 06:59 18:59


 


Intake Total 340  


 


Output Total 771 1202 475


 


Balance -435 1202 -301


 


Intake:   


 


  Oral 340  


 


Output:   


 


  Urine 775 1200 475


 


    Uretheral ([place])   475


 


  Stool  2 


 


Other:   


 


  Voiding Method Diaper Diaper Diaper





 Incontinent Incontinent Incontinent


 


  # Bowel Movements  1 














- Exam


GENERAL: The patient is lying in bed and does not appear in acute distress.





NEUROLOGICAL:


Higher mental function: The patient is drowsy but is awakeable to voice. He is 

oriented to self.  With options he stated he is in the hospital.  He is able to 

name watch and pen on repeated tries.  He is following few simple commands.  

Language is limited.  No neglect., 


Cranial nerves: The pupils are round, equal and reactive to light .  No facial 

weakness.    


Motor: The strength is hard to assess but was moving all extremities 

spontaneously above gravity.  Has transient episode of tremors of his body.  

Normal tone.  








Some of the workup during this admission in our facility consisted of:


Vitamin B12: 1290


TSH: 1.980


Ammonia <9


Urinalysis is negative for urinary tract infection.


Corona virus PCR was not detected.  Influenza A and B is not detected.


CT of the head is reported as no acute intracranial hemorrhage or gross acute 

cortical infarct.  Brain volume loss changes and suspected chronic microvascular

ischemic changes with other chronic an incidental finding.


I personally reviewed the CT of the head and I agree there is no acute or 

subacute ischemic and there is no truncal hemorrhage.


Routine EEG is abnormal.  The back of slowing suggestive of mild to moderate 

encephalopathy.  Otherwise there is no focal slowing, epileptiform discharges or

seizure on the EEG.











- Labs


CBC & Chem 7: 


                                 06/23/22 09:38





                                 06/24/22 09:06


Labs: 


                  Abnormal Lab Results - Last 24 Hours (Table)











  06/23/22 06/23/22 06/24/22 Range/Units





  16:29 20:48 09:06 


 


BUN    38 H  (9-20)  mg/dL


 


Creatinine    1.48 H  (0.66-1.25)  mg/dL


 


Glucose    156 H  (74-99)  mg/dL


 


POC Glucose (mg/dL)  118 H  112 H   ()  mg/dL








                      Microbiology - Last 24 Hours (Table)











 06/21/22 17:32 Blood Culture - Preliminary





 Blood    No Growth after 48 hours














Assessment and Plan


Assessment: 





Encephalopathy likely due to medication effect (Steroids) and possible 

pneumonia--slight improving


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 

patient initially had dementia then parkinson's symptoms and has visual 

hallucination)


REM sleep behavior disorder


Likely Acute Viral Upper Respiratory infection 





Plan: 





I stopped the Sinemet on 06/23/2022 since wife stated when he was on it he had 

worsening visual hallucination.  


I started the patient on Melatonin 3mg qhs and Clonazepam 0.25mg qhs on 

06/23/22.  Regarding Clonazepam, will titrate up, if needed for his REM 

behavioral disorder but it can cause drowsiness during the day and tolerance 

especially over the day.


Please avoid any steroids, narcotic oral opiates that affect the patient's 

mentation


Ordered repeat MRI Brain w/o if possible to assess if any new changes.


Q4 hour neuro checks.


Will defer the rest of management to primary team.


Upon discharge, recommend patient to follow-up with a neurologist within 1-2 

weeks.





The plan is discussed with patient's two son's who are at bedside and his 

primary attending.  





Walt Alfaro M.D.


Neuro-Hospitalist








Time with Patient: Less than 30

## 2022-06-25 RX ADMIN — Medication SCH: at 21:10

## 2022-06-25 RX ADMIN — MEMANTINE HYDROCHLORIDE SCH MG: 10 TABLET ORAL at 21:10

## 2022-06-25 RX ADMIN — DONEPEZIL HYDROCHLORIDE SCH MG: 10 TABLET ORAL at 09:18

## 2022-06-25 RX ADMIN — DEXTROSE MONOHYDRATE AND SODIUM CHLORIDE SCH: 5; .9 INJECTION, SOLUTION INTRAVENOUS at 17:44

## 2022-06-25 RX ADMIN — TAMSULOSIN HYDROCHLORIDE SCH MG: 0.4 CAPSULE ORAL at 17:46

## 2022-06-25 RX ADMIN — IPRATROPIUM BROMIDE AND ALBUTEROL SULFATE SCH ML: .5; 3 SOLUTION RESPIRATORY (INHALATION) at 12:20

## 2022-06-25 RX ADMIN — FAMOTIDINE SCH MG: 20 TABLET, FILM COATED ORAL at 09:18

## 2022-06-25 RX ADMIN — HEPARIN SODIUM SCH UNIT: 5000 INJECTION INTRAVENOUS; SUBCUTANEOUS at 21:10

## 2022-06-25 RX ADMIN — PIPERACILLIN AND TAZOBACTAM SCH MLS/HR: 3; .375 INJECTION, POWDER, FOR SOLUTION INTRAVENOUS at 03:56

## 2022-06-25 RX ADMIN — IPRATROPIUM BROMIDE AND ALBUTEROL SULFATE SCH ML: .5; 3 SOLUTION RESPIRATORY (INHALATION) at 20:11

## 2022-06-25 RX ADMIN — MEMANTINE HYDROCHLORIDE SCH MG: 10 TABLET ORAL at 09:18

## 2022-06-25 RX ADMIN — BUDESONIDE SCH MG: 1 SUSPENSION RESPIRATORY (INHALATION) at 20:11

## 2022-06-25 RX ADMIN — HEPARIN SODIUM SCH UNIT: 5000 INJECTION INTRAVENOUS; SUBCUTANEOUS at 09:18

## 2022-06-25 RX ADMIN — IPRATROPIUM BROMIDE AND ALBUTEROL SULFATE SCH ML: .5; 3 SOLUTION RESPIRATORY (INHALATION) at 08:05

## 2022-06-25 RX ADMIN — TIMOLOL MALEATE SCH DROPS: 5 SOLUTION OPHTHALMIC at 09:19

## 2022-06-25 RX ADMIN — LATANOPROST SCH DROPS: 50 SOLUTION OPHTHALMIC at 21:10

## 2022-06-25 RX ADMIN — Medication SCH MCG: at 09:18

## 2022-06-25 RX ADMIN — PIPERACILLIN AND TAZOBACTAM SCH MLS/HR: 3; .375 INJECTION, POWDER, FOR SOLUTION INTRAVENOUS at 17:46

## 2022-06-25 RX ADMIN — BUDESONIDE SCH MG: 1 SUSPENSION RESPIRATORY (INHALATION) at 08:05

## 2022-06-25 RX ADMIN — PIPERACILLIN AND TAZOBACTAM SCH MLS/HR: 3; .375 INJECTION, POWDER, FOR SOLUTION INTRAVENOUS at 09:18

## 2022-06-25 RX ADMIN — IPRATROPIUM BROMIDE AND ALBUTEROL SULFATE SCH ML: .5; 3 SOLUTION RESPIRATORY (INHALATION) at 16:00

## 2022-06-25 NOTE — P.PN
Subjective


Progress Note Date: 06/25/22





The patient is seen at bedside and per so was talking better today but today a 

bit more sleepy.  I spoke with primary team and they stated patient has urinary 

retention s/p [place] cather.





Objective





- Vital Signs


Vital signs: 


                                   Vital Signs











Temp  97.8 F   06/25/22 09:10


 


Pulse  88   06/25/22 09:10


 


Resp  18   06/25/22 09:10


 


BP  98/60   06/25/22 09:10


 


Pulse Ox  97   06/25/22 09:10


 


FiO2  21   06/24/22 08:01








                                 Intake & Output











 06/24/22 06/25/22 06/25/22





 18:59 06:59 18:59


 


Intake Total 100  120


 


Output Total 1175 650 


 


Balance -1075 -650 120


 


Intake:   


 


  Intake, IV Titration 100  





  Amount   


 


    Piperacillin-Tazobactam 3 100  





    .375 gm In Sodium   





    Chloride 0.9% 100 ml @ 25   





    mls/hr IVPB Q8H [facility] Rx#:   





    [id number]   


 


  Oral   120


 


Output:   


 


  Urine 1175 650 


 


    Uretheral ([place]) 725  


 


Other:   


 


  Voiding Method Diaper Indwelling Catheter 





 Incontinent  














- Exam


GENERAL: The patient is lying in bed and does not appear in acute distress.





NEUROLOGICAL:


Limited since was sleepy.


No facial weakness.


  Has transient episode of tremors of his body.    








Some of the workup during this admission in our facility consisted of:


Vitamin B12: 1290


TSH: 1.980


Ammonia <9


Urinalysis is negative for urinary tract infection.


Corona virus PCR was not detected.  Influenza A and B is not detected.


CT of the head is reported as no acute intracranial hemorrhage or gross acute 

cortical infarct.  Brain volume loss changes and suspected chronic microvascular

ischemic changes with other chronic an incidental finding.


I personally reviewed the CT of the head and I agree there is no acute or 

subacute ischemic and there is no truncal hemorrhage.  I feel the posterior horn

of the lateral ventricles are dilated.  It does not feel different compared to 

MRI Brain in 2018.


Routine EEG is abnormal.  The back of slowing suggestive of mild to moderate 

encephalopathy.  Otherwise there is no focal slowing, epileptiform discharges or

seizure on the EEG.


MRI of the brain is reported as no acute abnormality.  Sinus disease.  Age-

related changes of atrophy and chronic small vessel ischemia.  I personally 

reviewed MRI and agree with report.








- Labs


CBC & Chem 7: 


                                 06/23/22 09:38





                                 06/24/22 09:06


Labs: 


                  Abnormal Lab Results - Last 24 Hours (Table)











  06/24/22 06/24/22 Range/Units





  09:06 09:06 


 


BUN   38 H  (9-20)  mg/dL


 


Creatinine   1.48 H  (0.66-1.25)  mg/dL


 


Glucose   156 H  (74-99)  mg/dL


 


Procalcitonin  0.15 H   (0.02-0.09)  ng/mL








                      Microbiology - Last 24 Hours (Table)











 06/21/22 17:32 Blood Culture - Preliminary





 Blood    No Growth after 72 hours














Assessment and Plan


Assessment: 





Encephalopathy likely due to medication effect (Steroids) and metabolic 

encephalopathy -slight improving.  Also has component due to his advanced 

dementia


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 

patient initially had dementia then parkinson's symptoms and has visual halluc

ination).  His condition seems advanced


REM sleep behavior disorder


Mild acute kidney injury due to dehydration


Urinary retention s/ [place] cath


History of Prostate disease s/p resection





Plan: 





I stopped the Sinemet on 06/23/2022 since wife stated when he was on it he had w

orsening visual hallucination.  


I stopped Aricept since has urinary retention and can cause worsening of 

mentation.


Continue Melatonin 3mg qhs and Clonazepam 0.25mg qhs which both started on 

06/23/22.  Regarding Clonazepam, will titrate up to 0.5mg qhs for his REM 

behavioral disorder but it can cause drowsiness during the day and tolerance 

especially over the day.


Please avoid any steroids, narcotic oral opiates that affect the patient's 

mentation


Q4 hour neuro checks.


Will defer the rest of management to primary team.


Upon discharge, recommend patient to follow-up with a neurologist within 1-2 

weeks.





The plan is discussed with patient and son and daughter who are at bedside.


Will follow-up sporadically.  If improves patient is clear from neurological 

perspective.





Walt Alfaro M.D.


Neuro-Hospitalist








Time with Patient: Less than 30

## 2022-06-25 NOTE — P.PN
Subjective





This is a pleasant 82 minute with past medical history of : Dementia, Pneumonia,

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


Presents because of cough and subjective fever and generalized weakness.  

Patient somewhat disappointed historian and information was obtained with the 

help of his son they'll at bedside.  Family stated that patient has been 

complaining of from cough as well as his wife, so they took him to the clinic in

[place] which referred him to the hospital for his weakness.  As per son patient 

has been having this cough and for the last 3 days and it is associated in the 

beginning with sore throat.  Also he had some runny nose but the son states that

this is a chronic.  Currently no sore throat, no chest pain or dyspnea at rest. 

Patient denies fever.


No diarrhea or vomiting


Her family stated that patient have difficulty getting up from chair or walking,

he started using her walker the last few days 


Also patient looks confused.  The family also does not look much concerned 

because this been happening up and down.  Patient knows he is in the hospital 

but could not tell which hospital or sitting, he is disoriented to time and 

person.  He thought his son they'll at bedside is his brother Walter.  Patient also

has no insight into his illness


Patient also has history of Parkinson disease for many years, he has mild 

resting tremor today.


Patient also complaining of from difficulty passing urine but no dysuria.


No smoking, alcohol or illicit tracts





Patient is hemodynamically stable.  He is afebrile.


Labs including CBC showed only mild lymphopenia, BMP showing mildly elevated 

creatinine 1.29 compared to baseline of 1.1.


Rest of BMP and liver enzymes are unremarkable.  Troponin is negative less than 

0.012.  ProBNP is negative at 325.  Urinalysis is negative.


Coronavirus and influenza viruses are undetected


EKG showing junctional rhythm


While chest x-ray showing congestive heart failure with small pleural effusion 

as per report 


On admission patient received 1 dose of ceftriaxone.  He was started on





06/21/2022


Patient today still confused, he has difficulty holding stop because of his 

worsening tremors in both upper and lower extremities as well as his head.  It 

looks more stiff.  This could be related to his worsening Parkinson before we 

going to consult neurology service.


Repeat chest x-ray showed clearing of pulmonary congestion and interstitial i

nfiltrates per radiologist.


Echocardiogram showing preserved ejection fraction 55-60%, CT of the brain is 

negative for acute process.


Chest x-ray showed clearing of pulmonary congestion with interstitial 

infiltrates improved radiologist.  Poorcalcitonin is not significantly elevated

and it is only at 0.11.


No fever.  No significant tachycardia or tachypnea.


His medication.  He was started on Solu-Medrol and antibiotics changed to Zosyn.




Also patient will be transferred to


Discussed with the son at bedside.  





06/22/2022


I assessed the patient twice first occurred in the morning and later on together

with the neurologist, and both occasions the Sunday was at bedside.  Patient 

today is more confused, he does not follow commands or answer questions, he has 

tremor and abnormal jerking movements of both of upper and lower extremities 

especially at rest but more patient tried to move or patient provoked.


Patient withdrawal to painful stimuli.  No abnormal posters.  Patient paid 

attention and opens eyes when call his name.  Neurologist on the case and 

recommended EEG which shows no seizure-like activity.  There is mild-to-moderate

encephalopathy.


Repeat his x-ray showing no consolidation and was reviewed by myself.


Patient remains on Zosyn and we will repeatcalcitonin also we will repeat other

labs.  Repeat CBC, BMP magnesium, liver enzymes and poorcalcitonin.


B12, hemoglobin A1c, folic acid and TSH were unremarkable.


Echocardiogram showed ejection fraction of 55-60%.


Suspicion of infection is low however patient tripped on antibiotic.  

Possibility patient has last dementia and Parkinson disease, also with may be 

affected by medication.  Therefore steroids was discontinued today.  Patient on 

high-dose of donpezil , memantine and Sinemet.








06/23/2022


Patient is more calm today, less confused, he is a sleepy and wakes up to verbal

stimuli, he answers questions and follow commands more appropriately compared to

yesterday.


He is forgetful, he could not recognize his son at bedside


No evidence of cellulitis, he has some mild abdominal tenderness especially on 

the right side short of its significance however we going to recommend computed 

tomography scan of the abdomen with oral contrast.


Also his creatinine went up to 1.6 from 1.2.  We lowered his IV fluids D5 normal

saline down to 50 mL per hour.  Also will check bladder scan was more than 300 

with evidence of ureteral retention.  We start Flomax and consider straight cath

versus Duarte catheter


Discussed with family to encourage reorientation to the patient reacted


He is eating well and tolerates diet, he finished 50-75% of his diet today.


Neurology input is appreciated, Sinemet was discontinued.  Steroids was 

discontinued also from yesterday which might contribute to patient clinical 

picture.  Patient was started on melatonin and small dose of Klonopin and we 

will keep monitoring





06/24/2022


Both brothers were at bedside today together with the neurologist while 

examining the patient.  As per brothers yesterday evening patient was more awake

and he had good conversation with brothers, and he couldn't recognize their 

names correctly.  However this morning is again more confused which is goes with

delirium secondary to toxic metabolic encephalopathy.  MRI of the brain is 

negative for acute process or infarct or ischemia.  But showing age-related 

atrophy and chronic small vessel ischemia.


Creatinine actually is trending down today 1.6 down to 1.4.


Chest x-ray showing no acute changes but poorcalcitonin is trending down 0.15 

showing improvement in his infection which is most likely pneumonia seen on CAT 

scan of the abdomen yesterday.


Patient remains on Zosyn.


Patient has evidence of urine retention which is resolved after a pleasant Duarte

catheter.  This may be contributing to altered mental status.  We'll start 

Flomax as well.  Once this might benefit from a urologist as an outpatient.  

Contact information placed in the discharge instructions





06/25/2022


Patient awake, somewhat sleepy and lethargic but he knows his name and he was in

the hospital but he was disoriented worse hours, today his clinical picture 

similar to the day he came to the hospital.  He denies any specific other 

symptoms.  I discussed the case with neurology today.  Patient continued on 

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


Other than that vitals are stable.


Brain MRI showing no acute abnormality just H related atrophy and chronic small 

vessel ischemia.


Patient continued on Zosyn, normal saline at 50 mL per hour, melatonin and 

Klonopin as above, Sinemet and Aricept are discontinued.


Still has  [place] catheter for urinary retention


There is suspicion patient is keep swallowing problems which might lead to 

aspiration, we'll ask for swallowing evaluation which is pending.  Monday.  In 

the meantime discussed with the staff to keep aspiration precautions.


Discussed with the son at bedside


Prognosis remains guarded





Objective





- Vital Signs


Vital signs: 


                                   Vital Signs











Temp  97.8 F   06/25/22 09:10


 


Pulse  73   06/25/22 13:40


 


Resp  18   06/25/22 13:40


 


BP  108/71   06/25/22 12:05


 


Pulse Ox  94 L  06/25/22 12:05


 


FiO2  21   06/24/22 08:01








                                 Intake & Output











 06/24/22 06/25/22 06/25/22





 18:59 06:59 18:59


 


Intake Total 100  120


 


Output Total 1175 650 


 


Balance -1075 -650 120


 


Intake:   


 


  Intake, IV Titration 100  





  Amount   


 


    Piperacillin-Tazobactam 3 100  





    .375 gm In Sodium   





    Chloride 0.9% 100 ml @ 25   





    mls/hr IVPB Q8H [facility] Rx#:   





    [id number]   


 


  Oral   120


 


Output:   


 


  Urine 1175 650 


 


    Uretheral ([place]) 725  


 


Other:   


 


  Voiding Method Diaper Indwelling Catheter Indwelling Catheter





 Incontinent  














- Exam








-GENERAL: The patient is confused, sleepy but awakes to verbal stimuli, he does 

follow commands today, not in any acute distress.  under developed and 

generalized weakness


HEENT: Pupils are round and equally reacting to light. EOMI. No scleral icterus.

No conjunctival pallor. Normocephalic, atraumatic. No pharyngeal erythema. No 

thyromegaly. 


CARDIOVASCULAR: S1 and S2 present. No murmurs, rubs, or gallops. 


PULMONARY: Chest is clear to auscultation, no wheezing or crackles.  


-ABDOMEN: Soft, mild right abdominal tenderness, no rebound tenderness, no 

guarding, nondistended, normoactive bowel sounds. No palpable organomegaly. 


MUSCULOSKELETAL: No joint swelling or deformity. 


EXTREMITIES: No cyanosis, clubbing, or pedal edema. 


-NEUROLOGICAL: Gross neurological examination did not reveal any focal deficits.

 Patient is confused, he does not follows commands.  Cranial nerves are grossly 

intact.  Meningeal signs are absent  He has some generalized weakness but he 

denies any numbness


SKIN: No rashes. No petechiae





- Labs


CBC & Chem 7: 


                                 06/23/22 09:38





                                 06/24/22 09:06


Labs: 


                  Abnormal Lab Results - Last 24 Hours (Table)











  06/24/22 Range/Units





  09:06 


 


Procalcitonin  0.15 H  (0.02-0.09)  ng/mL








                      Microbiology - Last 24 Hours (Table)











 06/21/22 17:32 Blood Culture - Preliminary





 Blood    No Growth after 72 hours














Assessment and Plan


Assessment: 








Altered mental status most likely metabolic and more toxic encephalopathy,on  

the top of advanced dementia.  Patient condition worsened with steroids as well 

as possible sentiment effect


Worsening tremor most likely related to Parkinson disease


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  Combination with vascular 

dementia given his chronic ischemic changes seen on the MRI of the brain


Bilateral respiratory infiltrate suspicious for aspiration pneumonia.  Improved.




Urinary retention, status post Duarte catheter


Mild abdominal pain, rule out intra-abdominal pathology


Generalized weakness and deconditioning


Mild acute kidney injury and dehydration, improved


Alzheimer dementia


History of Parkinson disease


History of prostate disease status post surgical removal of the prostate


History of pneumonia




















Plan: 





This is a pleasant 82 old male who presents with pneumonia and metabolic 

encephalopathy


Continuous with  Zosyn 


Patient currently kept off his steroids.  Also sentiment was discontinued by 

neurologist


Patient is started on melatonin and clonidine


Neurology consult


Duarte catheter placement and Flomax


Continue with melatonin and Klonopin per primary team


labs and medication were reviewed..  Continue same treatment.  Continue with 

symptomatic treatment.  Resume home medication.  Monitor lytes and vitals.  DVT 

and GI prophylaxis.  Further recommendations depends on the clinical course of 

the patient


DVT prophylaxis: Subcutaneous heparin


GI Prophylaxis: Pepcid


PT/OT: Recommended subacute rehab,  consulted


Prognosis is guarded

## 2022-06-26 LAB
ANION GAP SERPL CALC-SCNC: 8 MMOL/L
BASOPHILS # BLD AUTO: 0.1 K/UL (ref 0–0.2)
BASOPHILS NFR BLD AUTO: 1 %
BUN SERPL-SCNC: 34 MG/DL (ref 9–20)
CALCIUM SPEC-MCNC: 8.7 MG/DL (ref 8.4–10.2)
CHLORIDE SERPL-SCNC: 110 MMOL/L (ref 98–107)
CO2 SERPL-SCNC: 23 MMOL/L (ref 22–30)
EOSINOPHIL # BLD AUTO: 0.3 K/UL (ref 0–0.7)
EOSINOPHIL NFR BLD AUTO: 7 %
ERYTHROCYTE [DISTWIDTH] IN BLOOD BY AUTOMATED COUNT: 4.75 M/UL (ref 4.3–5.9)
ERYTHROCYTE [DISTWIDTH] IN BLOOD: 12.3 % (ref 11.5–15.5)
GLUCOSE SERPL-MCNC: 99 MG/DL (ref 74–99)
HCT VFR BLD AUTO: 43.4 % (ref 39–53)
HGB BLD-MCNC: 14.1 GM/DL (ref 13–17.5)
LYMPHOCYTES # SPEC AUTO: 0.9 K/UL (ref 1–4.8)
LYMPHOCYTES NFR SPEC AUTO: 19 %
MAGNESIUM SPEC-SCNC: 2.2 MG/DL (ref 1.6–2.3)
MCH RBC QN AUTO: 29.7 PG (ref 25–35)
MCHC RBC AUTO-ENTMCNC: 32.5 G/DL (ref 31–37)
MCV RBC AUTO: 91.4 FL (ref 80–100)
MONOCYTES # BLD AUTO: 0.4 K/UL (ref 0–1)
MONOCYTES NFR BLD AUTO: 8 %
NEUTROPHILS # BLD AUTO: 3.2 K/UL (ref 1.3–7.7)
NEUTROPHILS NFR BLD AUTO: 64 %
PLATELET # BLD AUTO: 191 K/UL (ref 150–450)
POTASSIUM SERPL-SCNC: 4.3 MMOL/L (ref 3.5–5.1)
SODIUM SERPL-SCNC: 141 MMOL/L (ref 137–145)
WBC # BLD AUTO: 5.1 K/UL (ref 3.8–10.6)

## 2022-06-26 RX ADMIN — MEMANTINE HYDROCHLORIDE SCH MG: 10 TABLET ORAL at 20:43

## 2022-06-26 RX ADMIN — HEPARIN SODIUM SCH UNIT: 5000 INJECTION INTRAVENOUS; SUBCUTANEOUS at 20:42

## 2022-06-26 RX ADMIN — FAMOTIDINE SCH MG: 20 TABLET, FILM COATED ORAL at 10:30

## 2022-06-26 RX ADMIN — IPRATROPIUM BROMIDE AND ALBUTEROL SULFATE SCH ML: .5; 3 SOLUTION RESPIRATORY (INHALATION) at 10:36

## 2022-06-26 RX ADMIN — IPRATROPIUM BROMIDE AND ALBUTEROL SULFATE SCH ML: .5; 3 SOLUTION RESPIRATORY (INHALATION) at 15:45

## 2022-06-26 RX ADMIN — MEMANTINE HYDROCHLORIDE SCH MG: 10 TABLET ORAL at 10:30

## 2022-06-26 RX ADMIN — Medication SCH MG: at 20:43

## 2022-06-26 RX ADMIN — TIMOLOL MALEATE SCH DROPS: 5 SOLUTION OPHTHALMIC at 10:29

## 2022-06-26 RX ADMIN — IPRATROPIUM BROMIDE AND ALBUTEROL SULFATE SCH: .5; 3 SOLUTION RESPIRATORY (INHALATION) at 15:44

## 2022-06-26 RX ADMIN — PIPERACILLIN AND TAZOBACTAM SCH MLS/HR: 3; .375 INJECTION, POWDER, FOR SOLUTION INTRAVENOUS at 18:10

## 2022-06-26 RX ADMIN — PIPERACILLIN AND TAZOBACTAM SCH MLS/HR: 3; .375 INJECTION, POWDER, FOR SOLUTION INTRAVENOUS at 02:15

## 2022-06-26 RX ADMIN — LATANOPROST SCH DROPS: 50 SOLUTION OPHTHALMIC at 20:43

## 2022-06-26 RX ADMIN — DEXTROSE MONOHYDRATE AND SODIUM CHLORIDE SCH MLS/HR: 5; .9 INJECTION, SOLUTION INTRAVENOUS at 10:30

## 2022-06-26 RX ADMIN — BUDESONIDE SCH MG: 1 SUSPENSION RESPIRATORY (INHALATION) at 10:34

## 2022-06-26 RX ADMIN — BUDESONIDE SCH MG: 1 SUSPENSION RESPIRATORY (INHALATION) at 19:21

## 2022-06-26 RX ADMIN — PIPERACILLIN AND TAZOBACTAM SCH MLS/HR: 3; .375 INJECTION, POWDER, FOR SOLUTION INTRAVENOUS at 10:29

## 2022-06-26 RX ADMIN — HEPARIN SODIUM SCH UNIT: 5000 INJECTION INTRAVENOUS; SUBCUTANEOUS at 10:29

## 2022-06-26 RX ADMIN — TAMSULOSIN HYDROCHLORIDE SCH MG: 0.4 CAPSULE ORAL at 18:10

## 2022-06-26 RX ADMIN — Medication SCH MCG: at 10:30

## 2022-06-26 RX ADMIN — ACETAMINOPHEN PRN MG: 325 TABLET, FILM COATED ORAL at 10:30

## 2022-06-26 RX ADMIN — IPRATROPIUM BROMIDE AND ALBUTEROL SULFATE SCH ML: .5; 3 SOLUTION RESPIRATORY (INHALATION) at 19:21

## 2022-06-26 NOTE — P.PN
Subjective


Progress Note Date: 06/26/22


The patient is accompanied by the daughter who stated that he is doing better.  

It seems the patient sleep throughout the day yesterday prior to him receiving 

the increased Clonazepam.  Today he is more aware and responding.


per the daughter prior to coming the hospital a week prior he was walking on his

own.  He does use a cane.








Objective





- Vital Signs


Vital signs: 


                                   Vital Signs











Temp  97.5 F L  06/26/22 07:00


 


Pulse  76   06/26/22 10:48


 


Resp  18   06/26/22 07:00


 


BP  103/68   06/26/22 07:00


 


Pulse Ox  97   06/26/22 07:00


 


FiO2  21   06/24/22 08:01








                                 Intake & Output











 06/25/22 06/26/22 06/26/22





 18:59 06:59 18:59


 


Intake Total 520  0


 


Output Total  1645 


 


Balance 520 -1645 0


 


Intake:   


 


  Intake, IV Titration 400  





  Amount   


 


    Dextrose 5%-0.9% NaCl 1, 300  





    000 ml @ 50 mls/hr IV .   





    Q20H [facility] Rx#:[id number]   


 


    Piperacillin-Tazobactam 3 100  





    .375 gm In Sodium   





    Chloride 0.9% 100 ml @ 25   





    mls/hr IVPB Q8H [facility] Rx#:   





    [id number]   


 


  Oral 120  0


 


Output:   


 


  Urine  1645 


 


    Uretheral ([place])  820 


 


Other:   


 


  Voiding Method Indwelling Catheter Indwelling Catheter 


 


  # Bowel Movements  1 














- Exam


GENERAL: The patient is lying in bed and does not appear in acute distress.





NEUROLOGICAL:


Higher mental function: The patient is somewhat drowsy but is more awakeable to 

voice. He is oriented to self.  He correctly name objects (watch, pen).  He was 

able to name his daughter and the number or children he has.  He is somewhat 

slow to respond but better today compared to yesterday.  No neglect.   


Cranial nerves: The pupils are round, equal and reactive to light .  No facial 

weakness.    Has hypophonia and seems nasal. Protrudes tongue without 

difficulty. 


Motor: The strength is 5/5 throughout the uppers while lowers is moving above 

gravity but hard to assess lowers individual because of cooperation.   Has tra

nsient episode of tremors of his body.  Normal tone.  


Cerebellum: Is hard to assess.








Some of the workup during this admission in our facility consisted of:


Vitamin B12: 1290


TSH: 1.980


Ammonia <9


Urinalysis is negative for urinary tract infection.


Corona virus PCR was not detected.  Influenza A and B is not detected.


CT of the head is reported as no acute intracranial hemorrhage or gross acute 

cortical infarct.  Brain volume loss changes and suspected chronic microvascular

ischemic changes with other chronic an incidental finding.


I personally reviewed the CT of the head and I agree there is no acute or 

subacute ischemic and there is no truncal hemorrhage.  I feel the posterior horn

of the lateral ventricles are dilated.  It does not feel different compared to 

MRI Brain in 2018.


Routine EEG is abnormal.  The back of slowing suggestive of mild to moderate 

encephalopathy.  Otherwise there is no focal slowing, epileptiform discharges or

seizure on the EEG.


MRI of the brain is reported as no acute abnormality.  Sinus disease.  Age-

related changes of atrophy and chronic small vessel ischemia.  I personally 

reviewed MRI and agree with report.








- Labs


CBC & Chem 7: 


                                 06/26/22 06:45





                                 06/26/22 06:45


Labs: 


                  Abnormal Lab Results - Last 24 Hours (Table)











  06/26/22 06/26/22 Range/Units





  06:45 06:45 


 


Lymphocytes #  0.9 L   (1.0-4.8)  k/uL


 


Chloride   110 H  ()  mmol/L


 


BUN   34 H  (9-20)  mg/dL


 


Creatinine   1.35 H  (0.66-1.25)  mg/dL








                      Microbiology - Last 24 Hours (Table)











 06/25/22 17:58 Gram Stain - Preliminary





 Sputum Sputum Culture - Preliminary


 


 06/21/22 17:32 Blood Culture - Preliminary





 Blood    No Growth after 96 hours














Assessment and Plan


Assessment: 





Encephalopathy likely due to medication effect (Steroids) and metabolic 

encephalopathy  Also has component due to his advanced dementia. ---mentation im

proving compared to presentation but not back to baseline.


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 

patient initially had dementia then parkinson's symptoms and has visual 

hallucination).  His condition seems advanced


REM sleep behavior disorder


Mild acute kidney injury due to dehydration


Urinary retention s/ [place] cath


History of Prostate disease s/p resection





Plan: 





I stopped the Sinemet on 06/23/2022 since wife stated when he was on it he had 

worsening visual hallucination.  


I stopped Aricept since has urinary retention and can cause worsening of 

mentation.


Continue Melatonin 3mg qhs and Clonazepam 0.5mg qhs which both started on 

06/23/22.  Regarding Clonazepam, will titrate up down the line if needed slowly 

for his REM behavioral disorder but it can cause drowsiness during the day and 

tolerance especially over the day.


Please avoid any steroids, narcotic oral opiates that affect the patient's 

mentation


Q4 hour neuro checks.


Will defer the rest of management to primary team.


Upon discharge, recommend patient to follow-up with a neurologist within 1-2 

weeks (family wishes patient to follow-up with me as outpatient).





The plan is discussed with patient's daughter in length.  


Will follow-up sporadically.  If improves patient is clear for discharge from 

neurological perspective.


Dr. Das will start neurology service tomorrow AM.





Walt Alfaro M.D.


Neuro-Hospitalist








Time with Patient: Less than 30

## 2022-06-26 NOTE — P.PN
Subjective





This is a pleasant 82 minute with past medical history of : Dementia, Pneumonia,

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


Presents because of cough and subjective fever and generalized weakness.  

Patient somewhat disappointed historian and information was obtained with the 

help of his son they'll at bedside.  Family stated that patient has been 

complaining of from cough as well as his wife, so they took him to the clinic in

[place] which referred him to the hospital for his weakness.  As per son patient 

has been having this cough and for the last 3 days and it is associated in the 

beginning with sore throat.  Also he had some runny nose but the son states that

this is a chronic.  Currently no sore throat, no chest pain or dyspnea at rest. 

Patient denies fever.


No diarrhea or vomiting


Her family stated that patient have difficulty getting up from chair or walking,

he started using her walker the last few days 


Also patient looks confused.  The family also does not look much concerned 

because this been happening up and down.  Patient knows he is in the hospital 

but could not tell which hospital or sitting, he is disoriented to time and 

person.  He thought his son they'll at bedside is his brother Walter.  Patient also

has no insight into his illness


Patient also has history of Parkinson disease for many years, he has mild 

resting tremor today.


Patient also complaining of from difficulty passing urine but no dysuria.


No smoking, alcohol or illicit tracts





Patient is hemodynamically stable.  He is afebrile.


Labs including CBC showed only mild lymphopenia, BMP showing mildly elevated 

creatinine 1.29 compared to baseline of 1.1.


Rest of BMP and liver enzymes are unremarkable.  Troponin is negative less than 

0.012.  ProBNP is negative at 325.  Urinalysis is negative.


Coronavirus and influenza viruses are undetected


EKG showing junctional rhythm


While chest x-ray showing congestive heart failure with small pleural effusion 

as per report 


On admission patient received 1 dose of ceftriaxone.  He was started on





06/21/2022


Patient today still confused, he has difficulty holding stop because of his 

worsening tremors in both upper and lower extremities as well as his head.  It 

looks more stiff.  This could be related to his worsening Parkinson before we 

going to consult neurology service.


Repeat chest x-ray showed clearing of pulmonary congestion and interstitial i

nfiltrates per radiologist.


Echocardiogram showing preserved ejection fraction 55-60%, CT of the brain is 

negative for acute process.


Chest x-ray showed clearing of pulmonary congestion with interstitial 

infiltrates improved radiologist.  Poorcalcitonin is not significantly elevated

and it is only at 0.11.


No fever.  No significant tachycardia or tachypnea.


His medication.  He was started on Solu-Medrol and antibiotics changed to Zosyn.




Also patient will be transferred to


Discussed with the son at bedside.  





06/22/2022


I assessed the patient twice first occurred in the morning and later on together

with the neurologist, and both occasions the Sunday was at bedside.  Patient 

today is more confused, he does not follow commands or answer questions, he has 

tremor and abnormal jerking movements of both of upper and lower extremities 

especially at rest but more patient tried to move or patient provoked.


Patient withdrawal to painful stimuli.  No abnormal posters.  Patient paid 

attention and opens eyes when call his name.  Neurologist on the case and 

recommended EEG which shows no seizure-like activity.  There is mild-to-moderate

encephalopathy.


Repeat his x-ray showing no consolidation and was reviewed by myself.


Patient remains on Zosyn and we will repeatcalcitonin also we will repeat other

labs.  Repeat CBC, BMP magnesium, liver enzymes and poorcalcitonin.


B12, hemoglobin A1c, folic acid and TSH were unremarkable.


Echocardiogram showed ejection fraction of 55-60%.


Suspicion of infection is low however patient tripped on antibiotic.  

Possibility patient has last dementia and Parkinson disease, also with may be 

affected by medication.  Therefore steroids was discontinued today.  Patient on 

high-dose of donpezil , memantine and Sinemet.








06/23/2022


Patient is more calm today, less confused, he is a sleepy and wakes up to verbal

stimuli, he answers questions and follow commands more appropriately compared to

yesterday.


He is forgetful, he could not recognize his son at bedside


No evidence of cellulitis, he has some mild abdominal tenderness especially on 

the right side short of its significance however we going to recommend computed 

tomography scan of the abdomen with oral contrast.


Also his creatinine went up to 1.6 from 1.2.  We lowered his IV fluids D5 normal

saline down to 50 mL per hour.  Also will check bladder scan was more than 300 

with evidence of ureteral retention.  We start Flomax and consider straight cath

versus Duarte catheter


Discussed with family to encourage reorientation to the patient reacted


He is eating well and tolerates diet, he finished 50-75% of his diet today.


Neurology input is appreciated, Sinemet was discontinued.  Steroids was 

discontinued also from yesterday which might contribute to patient clinical 

picture.  Patient was started on melatonin and small dose of Klonopin and we 

will keep monitoring





06/24/2022


Both brothers were at bedside today together with the neurologist while 

examining the patient.  As per brothers yesterday evening patient was more awake

and he had good conversation with brothers, and he couldn't recognize their 

names correctly.  However this morning is again more confused which is goes with

delirium secondary to toxic metabolic encephalopathy.  MRI of the brain is 

negative for acute process or infarct or ischemia.  But showing age-related 

atrophy and chronic small vessel ischemia.


Creatinine actually is trending down today 1.6 down to 1.4.


Chest x-ray showing no acute changes but poorcalcitonin is trending down 0.15 

showing improvement in his infection which is most likely pneumonia seen on CAT 

scan of the abdomen yesterday.


Patient remains on Zosyn.


Patient has evidence of urine retention which is resolved after a pleasant Duarte

catheter.  This may be contributing to altered mental status.  We'll start 

Flomax as well.  Once this might benefit from a urologist as an outpatient.  

Contact information placed in the discharge instructions





06/25/2022


Patient awake, somewhat sleepy and lethargic but he knows his name and he was in

the hospital but he was disoriented worse hours, today his clinical picture 

similar to the day he came to the hospital.  He denies any specific other 

symptoms.  I discussed the case with neurology today.  Patient continued on 

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


Other than that vitals are stable.


Brain MRI showing no acute abnormality just H related atrophy and chronic small 

vessel ischemia.


Patient continued on Zosyn, normal saline at 50 mL per hour, melatonin and 

Klonopin as above, Sinemet and Aricept are discontinued.


Still has  Duarte catheter for urinary retention


There is suspicion patient is keep swallowing problems which might lead to 

aspiration, we'll ask for swallowing evaluation which is pending.  Monday.  In 

the meantime discussed with the staff to keep aspiration precautions.


Discussed with the son at bedside


Prognosis remains guarded 





06/26/2022


Patient is still confused although it looks similar to presentation and improved

compared to last 3-4 days ago.   at bedside states that her father could 

recognize her although he had some confusion about the other information.


Also patient has not been eating well, we'll do a swallow evaluation tomorrow.


Continue with Duarte catheter


Creatinine stable or slightly better at 1.35.


We will repeat labs tomorrow patient still on Zosyn, normal saline at 50 mL/h 

and melatonin and Klonopin 0.5 recommended by neurologist.  Sinemet and Aricept 

were discontinued.  


Family were thinking of taking home tomorrow but they agree to stay in the 

hospital for now.  Patient can be transferred out of select unit to the general 

medical floor with close monitoring.








Objective





- Vital Signs


Vital signs: 


                                   Vital Signs











Temp  97.2 F L  06/26/22 15:12


 


Pulse  88   06/26/22 15:57


 


Resp  18   06/26/22 15:12


 


BP  119/88   06/26/22 15:12


 


Pulse Ox  99   06/26/22 15:45


 


FiO2  21   06/26/22 15:45








                                 Intake & Output











 06/25/22 06/26/22 06/26/22





 18:59 06:59 18:59


 


Intake Total 520  940


 


Output Total  1645 950


 


Balance 520 -1645 -10


 


Intake:   


 


  IV   820


 


    Dextrose 5%-0.9% NaCl 1,   600





    000 ml @ 50 mls/hr IV .   





    Q20H [facility]IND[facility] Rx#:[id number]   


 


    Invasive Line 3   20


 


    Piperacillin-Tazobactam 3   200





    .375 gm In Sodium   





    Chloride 0.9% 100 ml @ 25   





    mls/hr IVPB Q8H [facility] Rx#:   





    [id number]   


 


  Intake, IV Titration 400  





  Amount   


 


    Dextrose 5%-0.9% NaCl 1, 300  





    000 ml @ 50 mls/hr IV .   





    Q20H [facility]I Rx#:[id number]   


 


    Piperacillin-Tazobactam 3 100  





    .375 gm In Sodium   





    Chloride 0.9% 100 ml @ 25   





    mls/hr IVPB Q8H [facility] Rx#:   





    [id number]   


 


  Oral 120  120


 


Output:   


 


  Urine  1645 950


 


    Uretheral (Duarte)  820 300


 


Other:   


 


  Voiding Method Indwelling Catheter Indwelling Catheter Indwelling Catheter


 


  # Voids   1


 


  # Bowel Movements  1 2














- Exam








-GENERAL: The patient is confused, sleepy but awakes to verbal stimuli, he does 

follow commands today, not in any acute distress.  under developed and 

generalized weakness


HEENT: Pupils are round and equally reacting to light. EOMI. No scleral icterus.

No conjunctival pallor. Normocephalic, atraumatic. No pharyngeal erythema. No 

thyromegaly. 


CARDIOVASCULAR: S1 and S2 present. No murmurs, rubs, or gallops. 


PULMONARY: Chest is clear to auscultation, no wheezing or crackles.  


-ABDOMEN: Soft, mild right abdominal tenderness, no rebound tenderness, no 

guarding, nondistended, normoactive bowel sounds. No palpable organomegaly. 


MUSCULOSKELETAL: No joint swelling or deformity. 


EXTREMITIES: No cyanosis, clubbing, or pedal edema. 


-NEUROLOGICAL: Gross neurological examination did not reveal any focal deficits.

 Patient is confused, he does not follows commands.  Cranial nerves are grossly 

intact.  Meningeal signs are absent  He has some generalized weakness but he 

denies any numbness


SKIN: No rashes. No petechiae





- Labs


CBC & Chem 7: 


                                 06/26/22 06:45





                                 06/26/22 06:45


Labs: 


                  Abnormal Lab Results - Last 24 Hours (Table)











  06/26/22 06/26/22 Range/Units





  06:45 06:45 


 


Lymphocytes #  0.9 L   (1.0-4.8)  k/uL


 


Chloride   110 H  ()  mmol/L


 


BUN   34 H  (9-20)  mg/dL


 


Creatinine   1.35 H  (0.66-1.25)  mg/dL








                      Microbiology - Last 24 Hours (Table)











 06/25/22 17:58 Gram Stain - Preliminary





 Sputum Sputum Culture - Preliminary


 


 06/21/22 17:32 Blood Culture - Preliminary





 Blood    No Growth after 96 hours














Assessment and Plan


Assessment: 








Altered mental status most likely metabolic and more toxic encephalopathy,on  

the top of advanced dementia.  Patient condition worsened with steroids as well 

as possible sentiment effect


Worsening tremor most likely related to Parkinson disease


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  Combination with vascular 

dementia given his chronic ischemic changes seen on the MRI of the brain


Bilateral respiratory infiltrate suspicious for aspiration pneumonia.  Improved.




Urinary retention, status post Duarte catheter


Mild abdominal pain, rule out intra-abdominal pathology


Generalized weakness and deconditioning


Mild acute kidney injury and dehydration, improved


Alzheimer dementia


History of Parkinson disease


History of prostate disease status post surgical removal of the prostate


History of pneumonia




















Plan: 





This is a pleasant 82 old male who presents with pneumonia and metabolic 

encephalopathy


Continuous with  Zosyn 


Patient currently kept off his steroids.  Also sentiment was discontinued by 

neurologist


Patient is started on melatonin and clonidine


Neurology consult


Duarte catheter placement and Flomax


Continue with melatonin and Klonopin per primary team


labs and medication were reviewed..  Continue same treatment.  Continue with 

symptomatic treatment.  Resume home medication.  Monitor lytes and vitals.  DVT 

and GI prophylaxis.  Further recommendations depends on the clinical course of 

the patient


DVT prophylaxis: Subcutaneous heparin


GI Prophylaxis: Pepcid


PT/OT: Recommended subacute rehab,  consulted


Prognosis is guarded

## 2022-06-27 LAB
ALBUMIN SERPL-MCNC: 3.5 G/DL (ref 3.5–5)
ALP SERPL-CCNC: 61 U/L (ref 38–126)
ALT SERPL-CCNC: 88 U/L (ref 4–49)
ANION GAP SERPL CALC-SCNC: 8 MMOL/L
AST SERPL-CCNC: 82 U/L (ref 17–59)
BILIRUB INDIRECT SERPL-MCNC: 0.3 MG/DL (ref 0–1.1)
BILIRUBIN DIRECT+TOT PNL SERPL-MCNC: 0.1 MG/DL (ref 0–0.2)
BUN SERPL-SCNC: 31 MG/DL (ref 9–20)
CALCIUM SPEC-MCNC: 8.7 MG/DL (ref 8.4–10.2)
CHLORIDE SERPL-SCNC: 106 MMOL/L (ref 98–107)
CO2 SERPL-SCNC: 26 MMOL/L (ref 22–30)
GLUCOSE SERPL-MCNC: 104 MG/DL (ref 74–99)
MAGNESIUM SPEC-SCNC: 2.1 MG/DL (ref 1.6–2.3)
POTASSIUM SERPL-SCNC: 4.1 MMOL/L (ref 3.5–5.1)
PROT SERPL-MCNC: 6.3 G/DL (ref 6.3–8.2)
SODIUM SERPL-SCNC: 140 MMOL/L (ref 137–145)

## 2022-06-27 RX ADMIN — IPRATROPIUM BROMIDE AND ALBUTEROL SULFATE SCH ML: .5; 3 SOLUTION RESPIRATORY (INHALATION) at 16:01

## 2022-06-27 RX ADMIN — LATANOPROST SCH DROPS: 50 SOLUTION OPHTHALMIC at 20:21

## 2022-06-27 RX ADMIN — IPRATROPIUM BROMIDE AND ALBUTEROL SULFATE SCH ML: .5; 3 SOLUTION RESPIRATORY (INHALATION) at 08:38

## 2022-06-27 RX ADMIN — Medication SCH MCG: at 09:37

## 2022-06-27 RX ADMIN — PIPERACILLIN AND TAZOBACTAM SCH MLS/HR: 3; .375 INJECTION, POWDER, FOR SOLUTION INTRAVENOUS at 09:37

## 2022-06-27 RX ADMIN — FAMOTIDINE SCH MG: 20 TABLET, FILM COATED ORAL at 09:37

## 2022-06-27 RX ADMIN — TIMOLOL MALEATE SCH DROPS: 5 SOLUTION OPHTHALMIC at 09:37

## 2022-06-27 RX ADMIN — PIPERACILLIN AND TAZOBACTAM SCH MLS/HR: 3; .375 INJECTION, POWDER, FOR SOLUTION INTRAVENOUS at 18:07

## 2022-06-27 RX ADMIN — MEMANTINE HYDROCHLORIDE SCH MG: 10 TABLET ORAL at 20:21

## 2022-06-27 RX ADMIN — HEPARIN SODIUM SCH UNIT: 5000 INJECTION INTRAVENOUS; SUBCUTANEOUS at 20:20

## 2022-06-27 RX ADMIN — BUDESONIDE SCH MG: 1 SUSPENSION RESPIRATORY (INHALATION) at 08:38

## 2022-06-27 RX ADMIN — TAMSULOSIN HYDROCHLORIDE SCH MG: 0.4 CAPSULE ORAL at 18:07

## 2022-06-27 RX ADMIN — HEPARIN SODIUM SCH UNIT: 5000 INJECTION INTRAVENOUS; SUBCUTANEOUS at 09:37

## 2022-06-27 RX ADMIN — Medication SCH MG: at 20:21

## 2022-06-27 RX ADMIN — IPRATROPIUM BROMIDE AND ALBUTEROL SULFATE SCH ML: .5; 3 SOLUTION RESPIRATORY (INHALATION) at 20:37

## 2022-06-27 RX ADMIN — IPRATROPIUM BROMIDE AND ALBUTEROL SULFATE SCH ML: .5; 3 SOLUTION RESPIRATORY (INHALATION) at 12:31

## 2022-06-27 RX ADMIN — PIPERACILLIN AND TAZOBACTAM SCH MLS/HR: 3; .375 INJECTION, POWDER, FOR SOLUTION INTRAVENOUS at 03:42

## 2022-06-27 RX ADMIN — BUDESONIDE SCH MG: 1 SUSPENSION RESPIRATORY (INHALATION) at 20:37

## 2022-06-27 RX ADMIN — MEMANTINE HYDROCHLORIDE SCH MG: 10 TABLET ORAL at 09:36

## 2022-06-27 RX ADMIN — DEXTROSE MONOHYDRATE AND SODIUM CHLORIDE SCH MLS/HR: 5; .9 INJECTION, SOLUTION INTRAVENOUS at 09:37

## 2022-06-27 NOTE — XR
EXAMINATION TYPE: XR chest 1V

 

DATE OF EXAM: 6/27/2022

 

COMPARISON: X-ray dated 6/24/2022

 

HISTORY: Dyspnea

 

TECHNIQUE: Single frontal view of the chest is obtained.

 

FINDINGS:  

Prominent interstitial lung markings, nonspecific and could be age-related however mild pulmonary warner
ma can't be excluded. Cardiomegaly, please correlate echocardiographic results.

 

No tigre area of pulmonary consolidation. No sizable pleural effusion or definite pneumothorax. Aorti
c atherosclerotic calcifications. Degenerative changes of the thoracic spine.

 

IMPRESSION:  

As above.

## 2022-06-27 NOTE — P.PN
Subjective





This is a pleasant 82 minute with past medical history of : Dementia, Pneumonia,

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


Presents because of cough and subjective fever and generalized weakness.  

Patient somewhat disappointed historian and information was obtained with the 

help of his son they'll at bedside.  Family stated that patient has been 

complaining of from cough as well as his wife, so they took him to the clinic in

[place] which referred him to the hospital for his weakness.  As per son patient 

has been having this cough and for the last 3 days and it is associated in the 

beginning with sore throat.  Also he had some runny nose but the son states that

this is a chronic.  Currently no sore throat, no chest pain or dyspnea at rest. 

Patient denies fever.


No diarrhea or vomiting


Her family stated that patient have difficulty getting up from chair or walking,

he started using her walker the last few days 


Also patient looks confused.  The family also does not look much concerned 

because this been happening up and down.  Patient knows he is in the hospital 

but could not tell which hospital or sitting, he is disoriented to time and 

person.  He thought his son they'll at bedside is his brother Walter.  Patient also

has no insight into his illness


Patient also has history of Parkinson disease for many years, he has mild 

resting tremor today.


Patient also complaining of from difficulty passing urine but no dysuria.


No smoking, alcohol or illicit tracts





Patient is hemodynamically stable.  He is afebrile.


Labs including CBC showed only mild lymphopenia, BMP showing mildly elevated 

creatinine 1.29 compared to baseline of 1.1.


Rest of BMP and liver enzymes are unremarkable.  Troponin is negative less than 

0.012.  ProBNP is negative at 325.  Urinalysis is negative.


Coronavirus and influenza viruses are undetected


EKG showing junctional rhythm


While chest x-ray showing congestive heart failure with small pleural effusion 

as per report 


On admission patient received 1 dose of ceftriaxone.  He was started on





06/21/2022


Patient today still confused, he has difficulty holding stop because of his 

worsening tremors in both upper and lower extremities as well as his head.  It 

looks more stiff.  This could be related to his worsening Parkinson before we 

going to consult neurology service.


Repeat chest x-ray showed clearing of pulmonary congestion and interstitial i

nfiltrates per radiologist.


Echocardiogram showing preserved ejection fraction 55-60%, CT of the brain is 

negative for acute process.


Chest x-ray showed clearing of pulmonary congestion with interstitial 

infiltrates improved radiologist.  Poorcalcitonin is not significantly elevated

and it is only at 0.11.


No fever.  No significant tachycardia or tachypnea.


His medication.  He was started on Solu-Medrol and antibiotics changed to Zosyn.




Also patient will be transferred to


Discussed with the son at bedside.  





06/22/2022


I assessed the patient twice first occurred in the morning and later on together

with the neurologist, and both occasions the Sunday was at bedside.  Patient 

today is more confused, he does not follow commands or answer questions, he has 

tremor and abnormal jerking movements of both of upper and lower extremities 

especially at rest but more patient tried to move or patient provoked.


Patient withdrawal to painful stimuli.  No abnormal posters.  Patient paid 

attention and opens eyes when call his name.  Neurologist on the case and 

recommended EEG which shows no seizure-like activity.  There is mild-to-moderate

encephalopathy.


Repeat his x-ray showing no consolidation and was reviewed by myself.


Patient remains on Zosyn and we will repeatcalcitonin also we will repeat other

labs.  Repeat CBC, BMP magnesium, liver enzymes and poorcalcitonin.


B12, hemoglobin A1c, folic acid and TSH were unremarkable.


Echocardiogram showed ejection fraction of 55-60%.


Suspicion of infection is low however patient tripped on antibiotic.  

Possibility patient has last dementia and Parkinson disease, also with may be 

affected by medication.  Therefore steroids was discontinued today.  Patient on 

high-dose of donpezil , memantine and Sinemet.








06/23/2022


Patient is more calm today, less confused, he is a sleepy and wakes up to verbal

stimuli, he answers questions and follow commands more appropriately compared to

yesterday.


He is forgetful, he could not recognize his son at bedside


No evidence of cellulitis, he has some mild abdominal tenderness especially on 

the right side short of its significance however we going to recommend computed 

tomography scan of the abdomen with oral contrast.


Also his creatinine went up to 1.6 from 1.2.  We lowered his IV fluids D5 normal

saline down to 50 mL per hour.  Also will check bladder scan was more than 300 

with evidence of ureteral retention.  We start Flomax and consider straight cath

versus Duarte catheter


Discussed with family to encourage reorientation to the patient reacted


He is eating well and tolerates diet, he finished 50-75% of his diet today.


Neurology input is appreciated, Sinemet was discontinued.  Steroids was 

discontinued also from yesterday which might contribute to patient clinical 

picture.  Patient was started on melatonin and small dose of Klonopin and we 

will keep monitoring





06/24/2022


Both brothers were at bedside today together with the neurologist while 

examining the patient.  As per brothers yesterday evening patient was more awake

and he had good conversation with brothers, and he couldn't recognize their 

names correctly.  However this morning is again more confused which is goes with

delirium secondary to toxic metabolic encephalopathy.  MRI of the brain is 

negative for acute process or infarct or ischemia.  But showing age-related 

atrophy and chronic small vessel ischemia.


Creatinine actually is trending down today 1.6 down to 1.4.


Chest x-ray showing no acute changes but poorcalcitonin is trending down 0.15 

showing improvement in his infection which is most likely pneumonia seen on CAT 

scan of the abdomen yesterday.


Patient remains on Zosyn.


Patient has evidence of urine retention which is resolved after a pleasant Duarte

catheter.  This may be contributing to altered mental status.  We'll start 

Flomax as well.  Once this might benefit from a urologist as an outpatient.  

Contact information placed in the discharge instructions





06/25/2022


Patient awake, somewhat sleepy and lethargic but he knows his name and he was in

the hospital but he was disoriented worse hours, today his clinical picture 

similar to the day he came to the hospital.  He denies any specific other 

symptoms.  I discussed the case with neurology today.  Patient continued on 

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


Other than that vitals are stable.


Brain MRI showing no acute abnormality just H related atrophy and chronic small 

vessel ischemia.


Patient continued on Zosyn, normal saline at 50 mL per hour, melatonin and 

Klonopin as above, Sinemet and Aricept are discontinued.


Still has  Duarte catheter for urinary retention


There is suspicion patient is keep swallowing problems which might lead to 

aspiration, we'll ask for swallowing evaluation which is pending.  Monday.  In 

the meantime discussed with the staff to keep aspiration precautions.


Discussed with the son at bedside


Prognosis remains guarded 





06/26/2022


Patient is still confused although it looks similar to presentation and improved

compared to last 3-4 days ago.   at bedside states that her father could 

recognize her although he had some confusion about the other information.


Also patient has not been eating well, we'll do a swallow evaluation tomorrow.


Continue with Duarte catheter


Creatinine stable or slightly better at 1.35.


We will repeat labs tomorrow patient still on Zosyn, normal saline at 50 mL/h 

and melatonin and Klonopin 0.5 recommended by neurologist.  Sinemet and Aricept 

were discontinued.  


Family were thinking of taking home tomorrow but they agree to stay in the 

hospital for now.  Patient can be transferred out of select unit to the general 

medical floor with close monitoring.





06/27/2022


Patient still confused and sleepy although his mentation is walks and waning, no

respiratory distress, no abdominal complaint,


Duarte catheter was discontinued, bladder scan today is 152-200, so he does not 

need another Duarte catheter for now.


Also he passed swallow evaluation with special diet, (dysphagia level 3 

chopped/thin liquids. Further recommended small bites/sips, upright positioning 

with all PO intake and 1:1 assist. Pt may have straws)


Histologic clumping in normal saline at 50 L/h, which a chest x-ray, no 

pulmonary congestion, no consolidation is breathing quietly.


He finishes half of his meals


I talked to both sons at bedside they haven't decided whether to take the 

patient home or ECF


Possible discharge in 24-48 hours if he remains stable and keep improvement





Objective





- Vital Signs


Vital signs: 


                                   Vital Signs











Temp  97.9 F   06/27/22 07:00


 


Pulse  82   06/27/22 08:53


 


Resp  16   06/27/22 07:00


 


BP  114/75   06/27/22 07:00


 


Pulse Ox  97   06/27/22 07:00


 


FiO2  21   06/26/22 15:45








                                 Intake & Output











 06/26/22 06/27/22 06/27/22





 18:59 06:59 18:59


 


Intake Total 940 10 60


 


Output Total 950  


 


Balance -10 10 60


 


Weight   68.422 kg


 


Intake:   


 


   10 10


 


    Dextrose 5%-0.9% NaCl 1, 600  





    000 ml @ 50 mls/hr IV .   





    Q20H [facility] Rx#:[id number]   


 


    Invasive Line 3 20  


 


    Invasive Line 4  10 10


 


    Piperacillin-Tazobactam 3 200  





    .375 gm In Sodium   





    Chloride 0.9% 100 ml @ 25   





    mls/hr IVPB Q8H [facility] Rx#:   





    [id number]   


 


  Oral 120  50


 


Output:   


 


  Urine 950  


 


    Uretheral (Duarte) 300  


 


Other:   


 


  Voiding Method Indwelling Catheter  Diaper


 


  # Voids 1 2 


 


  # Bowel Movements 2  














- Exam








-GENERAL: The patient is confused, sleepy but awakes to verbal stimuli, he does 

follow commands today, not in any acute distress.  under developed and 

generalized weakness


HEENT: Pupils are round and equally reacting to light. EOMI. No scleral icterus.

No conjunctival pallor. Normocephalic, atraumatic. No pharyngeal erythema. No 

thyromegaly. 


CARDIOVASCULAR: S1 and S2 present. No murmurs, rubs, or gallops. 


PULMONARY: Chest is clear to auscultation, no wheezing or crackles.  


-ABDOMEN: Soft, mild right abdominal tenderness, no rebound tenderness, no 

guarding, nondistended, normoactive bowel sounds. No palpable organomegaly. 


MUSCULOSKELETAL: No joint swelling or deformity. 


EXTREMITIES: No cyanosis, clubbing, or pedal edema. 


-NEUROLOGICAL: Gross neurological examination did not reveal any focal deficits.

 Patient is confused, he does not follows commands.  Cranial nerves are grossly 

intact.  Meningeal signs are absent  He has some generalized weakness but he 

denies any numbness


SKIN: No rashes. No petechiae





- Labs


CBC & Chem 7: 


                                 06/26/22 06:45





                                 06/27/22 07:07


Labs: 


                  Abnormal Lab Results - Last 24 Hours (Table)











  06/27/22 Range/Units





  07:07 


 


BUN  31 H  (9-20)  mg/dL


 


Glucose  104 H  (74-99)  mg/dL


 


AST  82 H  (17-59)  U/L


 


ALT  88 H  (4-49)  U/L








                      Microbiology - Last 24 Hours (Table)











 06/25/22 17:58 Gram Stain - Final





 Sputum Sputum Culture - Final


 


 06/21/22 17:32 Blood Culture - Preliminary





 Blood    No Growth after 120 hours














Assessment and Plan


Assessment: 








Altered mental status most likely metabolic and more toxic encephalopathy,on  

the top of advanced dementia.  Patient condition worsened with steroids as well 

as possible sentiment effect


Worsening tremor most likely related to Parkinson disease


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  Combination with vascular 

dementia given his chronic ischemic changes seen on the MRI of the brain


Bilateral respiratory infiltrate suspicious for aspiration pneumonia.  Improved.




Urinary retention, status post Duarte catheter


Mild abdominal pain, rule out intra-abdominal pathology


Generalized weakness and deconditioning


Mild acute kidney injury and dehydration, improved


Alzheimer dementia


History of Parkinson disease


History of prostate disease status post surgical removal of the prostate


History of pneumonia




















Plan: 





This is a pleasant 82 old male who presents with pneumonia and metabolic 

encephalopathy


Continuous with  Zosyn 


Keep of sentiment


Patient is started on melatonin and Klonopin


Neurologist have cleared the patient for discharge


Duarte catheter discontinued and patient continued on Flomax, no further hyperten

tita


Family to decide about placement versus home


labs and medication were reviewed..  Continue same treatment.  Continue with 

symptomatic treatment.  Resume home medication.  Monitor lytes and vitals.  DVT 

and GI prophylaxis.  Further recommendations depends on the clinical course of 

the patient


DVT prophylaxis: Subcutaneous heparin


GI Prophylaxis: Pepcid


PT/OT: Recommended subacute rehab,  consulted


Prognosis is guarded

## 2022-06-28 LAB
ALBUMIN SERPL-MCNC: 3.5 G/DL (ref 3.5–5)
ALP SERPL-CCNC: 49 U/L (ref 38–126)
ALT SERPL-CCNC: 69 U/L (ref 4–49)
ANION GAP SERPL CALC-SCNC: 9 MMOL/L
AST SERPL-CCNC: 62 U/L (ref 17–59)
BILIRUB INDIRECT SERPL-MCNC: 0.2 MG/DL (ref 0–1.1)
BILIRUBIN DIRECT+TOT PNL SERPL-MCNC: 0.1 MG/DL (ref 0–0.2)
BUN SERPL-SCNC: 28 MG/DL (ref 9–20)
CALCIUM SPEC-MCNC: 8.7 MG/DL (ref 8.4–10.2)
CHLORIDE SERPL-SCNC: 110 MMOL/L (ref 98–107)
CO2 SERPL-SCNC: 21 MMOL/L (ref 22–30)
GLUCOSE SERPL-MCNC: 117 MG/DL (ref 74–99)
POTASSIUM SERPL-SCNC: 4.1 MMOL/L (ref 3.5–5.1)
PROT SERPL-MCNC: 6.3 G/DL (ref 6.3–8.2)
SODIUM SERPL-SCNC: 140 MMOL/L (ref 137–145)

## 2022-06-28 RX ADMIN — TIMOLOL MALEATE SCH DROPS: 5 SOLUTION OPHTHALMIC at 09:52

## 2022-06-28 RX ADMIN — MEMANTINE HYDROCHLORIDE SCH MG: 10 TABLET ORAL at 09:50

## 2022-06-28 RX ADMIN — MEMANTINE HYDROCHLORIDE SCH: 10 TABLET ORAL at 16:21

## 2022-06-28 RX ADMIN — LATANOPROST SCH DROPS: 50 SOLUTION OPHTHALMIC at 20:41

## 2022-06-28 RX ADMIN — PIPERACILLIN AND TAZOBACTAM SCH MLS/HR: 3; .375 INJECTION, POWDER, FOR SOLUTION INTRAVENOUS at 18:12

## 2022-06-28 RX ADMIN — TIMOLOL MALEATE SCH: 5 SOLUTION OPHTHALMIC at 16:22

## 2022-06-28 RX ADMIN — MEMANTINE HYDROCHLORIDE SCH MG: 10 TABLET ORAL at 20:38

## 2022-06-28 RX ADMIN — DEXTROSE MONOHYDRATE AND SODIUM CHLORIDE SCH MLS/HR: 5; .9 INJECTION, SOLUTION INTRAVENOUS at 03:53

## 2022-06-28 RX ADMIN — IPRATROPIUM BROMIDE AND ALBUTEROL SULFATE SCH ML: .5; 3 SOLUTION RESPIRATORY (INHALATION) at 09:10

## 2022-06-28 RX ADMIN — PIPERACILLIN AND TAZOBACTAM SCH MLS/HR: 3; .375 INJECTION, POWDER, FOR SOLUTION INTRAVENOUS at 10:56

## 2022-06-28 RX ADMIN — Medication SCH MCG: at 09:51

## 2022-06-28 RX ADMIN — FAMOTIDINE SCH MG: 20 TABLET, FILM COATED ORAL at 09:51

## 2022-06-28 RX ADMIN — PIPERACILLIN AND TAZOBACTAM SCH MLS/HR: 3; .375 INJECTION, POWDER, FOR SOLUTION INTRAVENOUS at 03:54

## 2022-06-28 RX ADMIN — Medication SCH: at 16:22

## 2022-06-28 RX ADMIN — BUDESONIDE SCH MG: 1 SUSPENSION RESPIRATORY (INHALATION) at 19:15

## 2022-06-28 RX ADMIN — IPRATROPIUM BROMIDE AND ALBUTEROL SULFATE SCH ML: .5; 3 SOLUTION RESPIRATORY (INHALATION) at 19:15

## 2022-06-28 RX ADMIN — TAMSULOSIN HYDROCHLORIDE SCH MG: 0.4 CAPSULE ORAL at 18:12

## 2022-06-28 RX ADMIN — BUDESONIDE SCH MG: 1 SUSPENSION RESPIRATORY (INHALATION) at 09:10

## 2022-06-28 RX ADMIN — HEPARIN SODIUM SCH UNIT: 5000 INJECTION INTRAVENOUS; SUBCUTANEOUS at 09:51

## 2022-06-28 RX ADMIN — IPRATROPIUM BROMIDE AND ALBUTEROL SULFATE SCH: .5; 3 SOLUTION RESPIRATORY (INHALATION) at 12:06

## 2022-06-28 RX ADMIN — Medication SCH MG: at 20:40

## 2022-06-28 RX ADMIN — FAMOTIDINE SCH: 20 TABLET, FILM COATED ORAL at 16:22

## 2022-06-28 RX ADMIN — DEXTROSE MONOHYDRATE AND SODIUM CHLORIDE SCH MLS/HR: 5; .9 INJECTION, SOLUTION INTRAVENOUS at 23:03

## 2022-06-28 RX ADMIN — HEPARIN SODIUM SCH UNIT: 5000 INJECTION INTRAVENOUS; SUBCUTANEOUS at 20:40

## 2022-06-28 RX ADMIN — IPRATROPIUM BROMIDE AND ALBUTEROL SULFATE SCH ML: .5; 3 SOLUTION RESPIRATORY (INHALATION) at 16:48

## 2022-06-28 NOTE — P.PN
Subjective





This is a pleasant 82 minute with past medical history of : Dementia, Pneumonia,

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


Presents because of cough and subjective fever and generalized weakness.  

Patient somewhat disappointed historian and information was obtained with the 

help of his son they'll at bedside.  Family stated that patient has been 

complaining of from cough as well as his wife, so they took him to the clinic in

[place] which referred him to the hospital for his weakness.  As per son patient 

has been having this cough and for the last 3 days and it is associated in the 

beginning with sore throat.  Also he had some runny nose but the son states that

this is a chronic.  Currently no sore throat, no chest pain or dyspnea at rest. 

Patient denies fever.


No diarrhea or vomiting


Her family stated that patient have difficulty getting up from chair or walking,

he started using her walker the last few days 


Also patient looks confused.  The family also does not look much concerned 

because this been happening up and down.  Patient knows he is in the hospital 

but could not tell which hospital or sitting, he is disoriented to time and 

person.  He thought his son they'll at bedside is his brother Walter.  Patient also

has no insight into his illness


Patient also has history of Parkinson disease for many years, he has mild 

resting tremor today.


Patient also complaining of from difficulty passing urine but no dysuria.


No smoking, alcohol or illicit tracts





Patient is hemodynamically stable.  He is afebrile.


Labs including CBC showed only mild lymphopenia, BMP showing mildly elevated 

creatinine 1.29 compared to baseline of 1.1.


Rest of BMP and liver enzymes are unremarkable.  Troponin is negative less than 

0.012.  ProBNP is negative at 325.  Urinalysis is negative.


Coronavirus and influenza viruses are undetected


EKG showing junctional rhythm


While chest x-ray showing congestive heart failure with small pleural effusion 

as per report 


On admission patient received 1 dose of ceftriaxone.  He was started on





06/21/2022


Patient today still confused, he has difficulty holding stop because of his 

worsening tremors in both upper and lower extremities as well as his head.  It 

looks more stiff.  This could be related to his worsening Parkinson before we 

going to consult neurology service.


Repeat chest x-ray showed clearing of pulmonary congestion and interstitial i

nfiltrates per radiologist.


Echocardiogram showing preserved ejection fraction 55-60%, CT of the brain is 

negative for acute process.


Chest x-ray showed clearing of pulmonary congestion with interstitial 

infiltrates improved radiologist.  Poorcalcitonin is not significantly elevated

and it is only at 0.11.


No fever.  No significant tachycardia or tachypnea.


His medication.  He was started on Solu-Medrol and antibiotics changed to Zosyn.




Also patient will be transferred to


Discussed with the son at bedside.  





06/22/2022


I assessed the patient twice first occurred in the morning and later on together

with the neurologist, and both occasions the Sunday was at bedside.  Patient 

today is more confused, he does not follow commands or answer questions, he has 

tremor and abnormal jerking movements of both of upper and lower extremities 

especially at rest but more patient tried to move or patient provoked.


Patient withdrawal to painful stimuli.  No abnormal posters.  Patient paid 

attention and opens eyes when call his name.  Neurologist on the case and 

recommended EEG which shows no seizure-like activity.  There is mild-to-moderate

encephalopathy.


Repeat his x-ray showing no consolidation and was reviewed by myself.


Patient remains on Zosyn and we will repeatcalcitonin also we will repeat other

labs.  Repeat CBC, BMP magnesium, liver enzymes and poorcalcitonin.


B12, hemoglobin A1c, folic acid and TSH were unremarkable.


Echocardiogram showed ejection fraction of 55-60%.


Suspicion of infection is low however patient tripped on antibiotic.  

Possibility patient has last dementia and Parkinson disease, also with may be 

affected by medication.  Therefore steroids was discontinued today.  Patient on 

high-dose of donpezil , memantine and Sinemet.








06/23/2022


Patient is more calm today, less confused, he is a sleepy and wakes up to verbal

stimuli, he answers questions and follow commands more appropriately compared to

yesterday.


He is forgetful, he could not recognize his son at bedside


No evidence of cellulitis, he has some mild abdominal tenderness especially on 

the right side short of its significance however we going to recommend computed 

tomography scan of the abdomen with oral contrast.


Also his creatinine went up to 1.6 from 1.2.  We lowered his IV fluids D5 normal

saline down to 50 mL per hour.  Also will check bladder scan was more than 300 

with evidence of ureteral retention.  We start Flomax and consider straight cath

versus Duarte catheter


Discussed with family to encourage reorientation to the patient reacted


He is eating well and tolerates diet, he finished 50-75% of his diet today.


Neurology input is appreciated, Sinemet was discontinued.  Steroids was 

discontinued also from yesterday which might contribute to patient clinical 

picture.  Patient was started on melatonin and small dose of Klonopin and we 

will keep monitoring





06/24/2022


Both brothers were at bedside today together with the neurologist while 

examining the patient.  As per brothers yesterday evening patient was more awake

and he had good conversation with brothers, and he couldn't recognize their 

names correctly.  However this morning is again more confused which is goes with

delirium secondary to toxic metabolic encephalopathy.  MRI of the brain is 

negative for acute process or infarct or ischemia.  But showing age-related 

atrophy and chronic small vessel ischemia.


Creatinine actually is trending down today 1.6 down to 1.4.


Chest x-ray showing no acute changes but poorcalcitonin is trending down 0.15 

showing improvement in his infection which is most likely pneumonia seen on CAT 

scan of the abdomen yesterday.


Patient remains on Zosyn.


Patient has evidence of urine retention which is resolved after a pleasant Duarte

catheter.  This may be contributing to altered mental status.  We'll start 

Flomax as well.  Once this might benefit from a urologist as an outpatient.  

Contact information placed in the discharge instructions





06/25/2022


Patient awake, somewhat sleepy and lethargic but he knows his name and he was in

the hospital but he was disoriented worse hours, today his clinical picture 

similar to the day he came to the hospital.  He denies any specific other 

symptoms.  I discussed the case with neurology today.  Patient continued on 

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


Other than that vitals are stable.


Brain MRI showing no acute abnormality just H related atrophy and chronic small 

vessel ischemia.


Patient continued on Zosyn, normal saline at 50 mL per hour, melatonin and 

Klonopin as above, Sinemet and Aricept are discontinued.


Still has  Duarte catheter for urinary retention


There is suspicion patient is keep swallowing problems which might lead to 

aspiration, we'll ask for swallowing evaluation which is pending.  Monday.  In 

the meantime discussed with the staff to keep aspiration precautions.


Discussed with the son at bedside


Prognosis remains guarded 





06/26/2022


Patient is still confused although it looks similar to presentation and improved

compared to last 3-4 days ago.   at bedside states that her father could 

recognize her although he had some confusion about the other information.


Also patient has not been eating well, we'll do a swallow evaluation tomorrow.


Continue with Duarte catheter


Creatinine stable or slightly better at 1.35.


We will repeat labs tomorrow patient still on Zosyn, normal saline at 50 mL/h 

and melatonin and Klonopin 0.5 recommended by neurologist.  Sinemet and Aricept 

were discontinued.  


Family were thinking of taking home tomorrow but they agree to stay in the 

hospital for now.  Patient can be transferred out of select unit to the general 

medical floor with close monitoring.





06/27/2022


Patient still confused and sleepy although his mentation is walks and waning, no

respiratory distress, no abdominal complaint,


Duarte catheter was discontinued, bladder scan today is 152-200, so he does not 

need another Duarte catheter for now.


Also he passed swallow evaluation with special diet, (dysphagia level 3 

chopped/thin liquids. Further recommended small bites/sips, upright positioning 

with all PO intake and 1:1 assist. Pt may have straws)


Histologic clumping in normal saline at 50 L/h, which a chest x-ray, no 

pulmonary congestion, no consolidation is breathing quietly.


He finishes half of his meals


I talked to both sons at bedside they haven't decided whether to take the 

patient home or ECF


Possible discharge in 24-48 hours if he remains stable and keep improvement





06/28/2022


Patient is very sleepy today even after the dose of Klonopin lower 0.25 

yesterday therefore we will control the dose of Klonopin tonight and keep 

monitoring tomorrow.


Also we will check labs today including BMP, liver enzymes and ammonia level.


Other than that his poorcalcitonin actually is trending down at 0.11 and he is 

kept on Zosyn, no significant respiratory symptoms.  And his abdomen looks soft.

 Bladder scan does not need Duarte catheter now





Objective





- Vital Signs


Vital signs: 


                                   Vital Signs











Temp  97.6 F   06/27/22 20:06


 


Pulse  78   06/28/22 09:20


 


Resp  18   06/28/22 03:51


 


BP  125/75   06/28/22 03:51


 


Pulse Ox  97   06/28/22 09:11


 


FiO2  21   06/26/22 15:45








                                 Intake & Output











 06/27/22 06/28/22 06/28/22





 18:59 06:59 18:59


 


Intake Total 228  


 


Balance 228  


 


Weight 68.422 kg  


 


Intake:   


 


  IV 10  


 


    Invasive Line 4 10  


 


  Oral 218  


 


Other:   


 


  Voiding Method Diaper Diaper 


 


  # Voids 4 2 


 


  # Bowel Movements 2  














- Exam








-GENERAL: The patient is confused, sleepy but awakes to verbal stimuli, he does 

follow commands today, not in any acute distress.  under developed and 

generalized weakness


HEENT: Pupils are round and equally reacting to light. EOMI. No scleral icterus.

No conjunctival pallor. Normocephalic, atraumatic. No pharyngeal erythema. No 

thyromegaly. 


CARDIOVASCULAR: S1 and S2 present. No murmurs, rubs, or gallops. 


PULMONARY: Chest is clear to auscultation, no wheezing or crackles.  


-ABDOMEN: Soft, mild right abdominal tenderness, no rebound tenderness, no 

guarding, nondistended, normoactive bowel sounds. No palpable organomegaly. 


MUSCULOSKELETAL: No joint swelling or deformity. 


EXTREMITIES: No cyanosis, clubbing, or pedal edema. 


-NEUROLOGICAL: Gross neurological examination did not reveal any focal deficits.

 Patient is confused, he does not follows commands.  Cranial nerves are grossly 

intact.  Meningeal signs are absent  He has some generalized weakness but he 

denies any numbness


SKIN: No rashes. No petechiae





- Labs


CBC & Chem 7: 


                                 06/26/22 06:45





                                 06/27/22 07:07


Labs: 


                  Abnormal Lab Results - Last 24 Hours (Table)











  06/27/22 Range/Units





  07:07 


 


Procalcitonin  0.11 H  (0.02-0.09)  ng/mL








                      Microbiology - Last 24 Hours (Table)











 06/21/22 17:32 Blood Culture - Final





 Blood    No Growth after 144 hours


 


 06/25/22 17:58 Gram Stain - Final





 Sputum Sputum Culture - Final














Assessment and Plan


Assessment: 








Altered mental status most likely metabolic and more toxic encephalopathy,on  

the top of advanced dementia.  Patient condition worsened with steroids as well 

as possible sentiment effect


Worsening tremor most likely related to Parkinson disease


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  Combination with vascular 

dementia given his chronic ischemic changes seen on the MRI of the brain


Bilateral respiratory infiltrate suspicious for aspiration pneumonia.  Improved.




Urinary retention, status post Duarte catheter


Mild abdominal pain, rule out intra-abdominal pathology


Generalized weakness and deconditioning


Mild acute kidney injury and dehydration, improved


Alzheimer dementia


History of Parkinson disease


History of prostate disease status post surgical removal of the prostate


History of pneumonia




















Plan: 





This is a pleasant 82 old male who presents with pneumonia and metabolic 

encephalopathy


Continuous with  Zosyn 


Keep of sentiment


Patient is started on melatonin.  Hold Klonopin


Neurologist have cleared the patient for discharge


Duarte catheter discontinued and patient continued on Flomax, no further 

hypertension


Family to decide about placement versus home


labs and medication were reviewed..  Continue same treatment.  Continue with 

symptomatic treatment.  Resume home medication.  Monitor lytes and vitals.  DVT 

and GI prophylaxis.  Further recommendations depends on the clinical course of 

the patient


DVT prophylaxis: Subcutaneous heparin


GI Prophylaxis: Pepcid


PT/OT: Recommended subacute rehab,  consulted


Prognosis is guarded

## 2022-06-29 RX ADMIN — DEXTROSE MONOHYDRATE AND SODIUM CHLORIDE SCH: 5; .9 INJECTION, SOLUTION INTRAVENOUS at 21:48

## 2022-06-29 RX ADMIN — LATANOPROST SCH DROPS: 50 SOLUTION OPHTHALMIC at 21:40

## 2022-06-29 RX ADMIN — BUDESONIDE SCH MG: 1 SUSPENSION RESPIRATORY (INHALATION) at 08:36

## 2022-06-29 RX ADMIN — TIMOLOL MALEATE SCH DROPS: 5 SOLUTION OPHTHALMIC at 08:49

## 2022-06-29 RX ADMIN — MEMANTINE HYDROCHLORIDE SCH MG: 10 TABLET ORAL at 21:39

## 2022-06-29 RX ADMIN — FAMOTIDINE SCH MG: 20 TABLET, FILM COATED ORAL at 08:48

## 2022-06-29 RX ADMIN — IPRATROPIUM BROMIDE AND ALBUTEROL SULFATE SCH ML: .5; 3 SOLUTION RESPIRATORY (INHALATION) at 15:54

## 2022-06-29 RX ADMIN — IPRATROPIUM BROMIDE AND ALBUTEROL SULFATE SCH ML: .5; 3 SOLUTION RESPIRATORY (INHALATION) at 20:32

## 2022-06-29 RX ADMIN — IPRATROPIUM BROMIDE AND ALBUTEROL SULFATE SCH ML: .5; 3 SOLUTION RESPIRATORY (INHALATION) at 11:11

## 2022-06-29 RX ADMIN — MEMANTINE HYDROCHLORIDE SCH MG: 10 TABLET ORAL at 08:48

## 2022-06-29 RX ADMIN — Medication SCH MG: at 21:39

## 2022-06-29 RX ADMIN — BUDESONIDE SCH MG: 1 SUSPENSION RESPIRATORY (INHALATION) at 20:32

## 2022-06-29 RX ADMIN — HEPARIN SODIUM SCH UNIT: 5000 INJECTION INTRAVENOUS; SUBCUTANEOUS at 21:39

## 2022-06-29 RX ADMIN — Medication SCH MCG: at 08:48

## 2022-06-29 RX ADMIN — HEPARIN SODIUM SCH UNIT: 5000 INJECTION INTRAVENOUS; SUBCUTANEOUS at 08:48

## 2022-06-29 RX ADMIN — TAMSULOSIN HYDROCHLORIDE SCH MG: 0.4 CAPSULE ORAL at 17:20

## 2022-06-29 RX ADMIN — IPRATROPIUM BROMIDE AND ALBUTEROL SULFATE SCH ML: .5; 3 SOLUTION RESPIRATORY (INHALATION) at 08:36

## 2022-06-29 NOTE — P.PN
Subjective





This is a pleasant 82 minute with past medical history of : Dementia, Pneumonia,

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


Presents because of cough and subjective fever and generalized weakness.  

Patient somewhat disappointed historian and information was obtained with the 

help of his son they'll at bedside.  Family stated that patient has been 

complaining of from cough as well as his wife, so they took him to the clinic in

[place] which referred him to the hospital for his weakness.  As per son patient 

has been having this cough and for the last 3 days and it is associated in the 

beginning with sore throat.  Also he had some runny nose but the son states that

this is a chronic.  Currently no sore throat, no chest pain or dyspnea at rest. 

Patient denies fever.


No diarrhea or vomiting


Her family stated that patient have difficulty getting up from chair or walking,

he started using her walker the last few days 


Also patient looks confused.  The family also does not look much concerned 

because this been happening up and down.  Patient knows he is in the hospital 

but could not tell which hospital or sitting, he is disoriented to time and 

person.  He thought his son they'll at bedside is his brother Walter.  Patient also

has no insight into his illness


Patient also has history of Parkinson disease for many years, he has mild 

resting tremor today.


Patient also complaining of from difficulty passing urine but no dysuria.


No smoking, alcohol or illicit tracts





Patient is hemodynamically stable.  He is afebrile.


Labs including CBC showed only mild lymphopenia, BMP showing mildly elevated 

creatinine 1.29 compared to baseline of 1.1.


Rest of BMP and liver enzymes are unremarkable.  Troponin is negative less than 

0.012.  ProBNP is negative at 325.  Urinalysis is negative.


Coronavirus and influenza viruses are undetected


EKG showing junctional rhythm


While chest x-ray showing congestive heart failure with small pleural effusion 

as per report 


On admission patient received 1 dose of ceftriaxone.  He was started on





06/21/2022


Patient today still confused, he has difficulty holding stop because of his 

worsening tremors in both upper and lower extremities as well as his head.  It 

looks more stiff.  This could be related to his worsening Parkinson before we 

going to consult neurology service.


Repeat chest x-ray showed clearing of pulmonary congestion and interstitial i

nfiltrates per radiologist.


Echocardiogram showing preserved ejection fraction 55-60%, CT of the brain is 

negative for acute process.


Chest x-ray showed clearing of pulmonary congestion with interstitial 

infiltrates improved radiologist.  Poorcalcitonin is not significantly elevated

and it is only at 0.11.


No fever.  No significant tachycardia or tachypnea.


His medication.  He was started on Solu-Medrol and antibiotics changed to Zosyn.




Also patient will be transferred to


Discussed with the son at bedside.  





06/22/2022


I assessed the patient twice first occurred in the morning and later on together

with the neurologist, and both occasions the Sunday was at bedside.  Patient 

today is more confused, he does not follow commands or answer questions, he has 

tremor and abnormal jerking movements of both of upper and lower extremities 

especially at rest but more patient tried to move or patient provoked.


Patient withdrawal to painful stimuli.  No abnormal posters.  Patient paid 

attention and opens eyes when call his name.  Neurologist on the case and 

recommended EEG which shows no seizure-like activity.  There is mild-to-moderate

encephalopathy.


Repeat his x-ray showing no consolidation and was reviewed by myself.


Patient remains on Zosyn and we will repeatcalcitonin also we will repeat other

labs.  Repeat CBC, BMP magnesium, liver enzymes and poorcalcitonin.


B12, hemoglobin A1c, folic acid and TSH were unremarkable.


Echocardiogram showed ejection fraction of 55-60%.


Suspicion of infection is low however patient tripped on antibiotic.  

Possibility patient has last dementia and Parkinson disease, also with may be 

affected by medication.  Therefore steroids was discontinued today.  Patient on 

high-dose of donpezil , memantine and Sinemet.








06/23/2022


Patient is more calm today, less confused, he is a sleepy and wakes up to verbal

stimuli, he answers questions and follow commands more appropriately compared to

yesterday.


He is forgetful, he could not recognize his son at bedside


No evidence of cellulitis, he has some mild abdominal tenderness especially on 

the right side short of its significance however we going to recommend computed 

tomography scan of the abdomen with oral contrast.


Also his creatinine went up to 1.6 from 1.2.  We lowered his IV fluids D5 normal

saline down to 50 mL per hour.  Also will check bladder scan was more than 300 

with evidence of ureteral retention.  We start Flomax and consider straight cath

versus Duarte catheter


Discussed with family to encourage reorientation to the patient reacted


He is eating well and tolerates diet, he finished 50-75% of his diet today.


Neurology input is appreciated, Sinemet was discontinued.  Steroids was 

discontinued also from yesterday which might contribute to patient clinical 

picture.  Patient was started on melatonin and small dose of Klonopin and we 

will keep monitoring





06/24/2022


Both brothers were at bedside today together with the neurologist while 

examining the patient.  As per brothers yesterday evening patient was more awake

and he had good conversation with brothers, and he couldn't recognize their 

names correctly.  However this morning is again more confused which is goes with

delirium secondary to toxic metabolic encephalopathy.  MRI of the brain is 

negative for acute process or infarct or ischemia.  But showing age-related 

atrophy and chronic small vessel ischemia.


Creatinine actually is trending down today 1.6 down to 1.4.


Chest x-ray showing no acute changes but poorcalcitonin is trending down 0.15 

showing improvement in his infection which is most likely pneumonia seen on CAT 

scan of the abdomen yesterday.


Patient remains on Zosyn.


Patient has evidence of urine retention which is resolved after a pleasant Duarte

catheter.  This may be contributing to altered mental status.  We'll start 

Flomax as well.  Once this might benefit from a urologist as an outpatient.  

Contact information placed in the discharge instructions





06/25/2022


Patient awake, somewhat sleepy and lethargic but he knows his name and he was in

the hospital but he was disoriented worse hours, today his clinical picture 

similar to the day he came to the hospital.  He denies any specific other 

symptoms.  I discussed the case with neurology today.  Patient continued on 

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


Other than that vitals are stable.


Brain MRI showing no acute abnormality just H related atrophy and chronic small 

vessel ischemia.


Patient continued on Zosyn, normal saline at 50 mL per hour, melatonin and 

Klonopin as above, Sinemet and Aricept are discontinued.


Still has  Duarte catheter for urinary retention


There is suspicion patient is keep swallowing problems which might lead to 

aspiration, we'll ask for swallowing evaluation which is pending.  Monday.  In 

the meantime discussed with the staff to keep aspiration precautions.


Discussed with the son at bedside


Prognosis remains guarded 





06/26/2022


Patient is still confused although it looks similar to presentation and improved

compared to last 3-4 days ago.   at bedside states that her father could 

recognize her although he had some confusion about the other information.


Also patient has not been eating well, we'll do a swallow evaluation tomorrow.


Continue with Duarte catheter


Creatinine stable or slightly better at 1.35.


We will repeat labs tomorrow patient still on Zosyn, normal saline at 50 mL/h 

and melatonin and Klonopin 0.5 recommended by neurologist.  Sinemet and Aricept 

were discontinued.  


Family were thinking of taking home tomorrow but they agree to stay in the 

hospital for now.  Patient can be transferred out of select unit to the general 

medical floor with close monitoring.





06/27/2022


Patient still confused and sleepy although his mentation is walks and waning, no

respiratory distress, no abdominal complaint,


Duarte catheter was discontinued, bladder scan today is 152-200, so he does not 

need another Duarte catheter for now.


Also he passed swallow evaluation with special diet, (dysphagia level 3 

chopped/thin liquids. Further recommended small bites/sips, upright positioning 

with all PO intake and 1:1 assist. Pt may have straws)


Histologic clumping in normal saline at 50 L/h, which a chest x-ray, no 

pulmonary congestion, no consolidation is breathing quietly.


He finishes half of his meals


I talked to both sons at bedside they haven't decided whether to take the 

patient home or ECF


Possible discharge in 24-48 hours if he remains stable and keep improvement





06/28/2022


Patient is very sleepy today even after the dose of Klonopin lower 0.25 

yesterday therefore we will control the dose of Klonopin tonight and keep 

monitoring tomorrow.


Also we will check labs today including BMP, liver enzymes and ammonia level.


Other than that his poorcalcitonin actually is trending down at 0.11 and he is 

kept on Zosyn, no significant respiratory symptoms.  And his abdomen looks soft.

 Bladder scan does not need Duarte catheter now





06/29/2022


Patient is more awake compared to yesterday however he still confused, Klonopin 

dose remains at 0.25 and I discussed the case with the neurologist.


Sinemet remains on hold for neurologist recommendation however her neurologist 

if that does not improve on patient cannot work then he may be placed back on 

Sinemet.


Zosyn can be switched to Augmentin as his pneumonia is improving.  No fever or 

leukocytosis.


Discussed with son at bedside and agree with the plan.


Patient pending placement











Objective





- Vital Signs


Vital signs: 


                                   Vital Signs











Temp  98.6 F   06/29/22 04:00


 


Pulse  80   06/29/22 11:20


 


Resp  16   06/29/22 04:00


 


BP  151/86   06/29/22 04:00


 


Pulse Ox  95   06/29/22 04:00


 


FiO2  21   06/26/22 15:45








                                 Intake & Output











 06/28/22 06/29/22 06/29/22





 18:59 06:59 18:59


 


Intake Total 0  125


 


Output Total 1001 1200 500


 


Balance -1001 -1200 -375


 


Intake:   


 


  Oral 0  125


 


Output:   


 


  Urine 1000 1200 500


 


  Stool 1  


 


Other:   


 


  Voiding Method Diaper External Catheter 


 


  # Voids 1  


 


  # Bowel Movements 2  














- Exam








-GENERAL: The patient is confused, but more awake, he does follow commands 

today, not in any acute distress.  under developed and generalized weakness


HEENT: Pupils are round and equally reacting to light. EOMI. No scleral icterus.

No conjunctival pallor. Normocephalic, atraumatic. No pharyngeal erythema. No 

thyromegaly. 


CARDIOVASCULAR: S1 and S2 present. No murmurs, rubs, or gallops. 


PULMONARY: Chest is clear to auscultation, no wheezing or crackles.  


-ABDOMEN: Soft, mild right abdominal tenderness, no rebound tenderness, no 

guarding, nondistended, normoactive bowel sounds. No palpable organomegaly. 


MUSCULOSKELETAL: No joint swelling or deformity. 


EXTREMITIES: No cyanosis, clubbing, or pedal edema. 


-NEUROLOGICAL: Gross neurological examination did not reveal any focal deficits.

 Patient is confused, he does not follows commands.  Cranial nerves are grossly 

intact.  Meningeal signs are absent  He has some generalized weakness but he 

denies any numbness


SKIN: No rashes. No petechiae





- Labs


CBC & Chem 7: 


                                 06/26/22 06:45





                                 06/28/22 11:36





Assessment and Plan


Assessment: 








Altered mental status most likely metabolic and more toxic encephalopathy,on  

the top of advanced dementia.  Patient condition worsened with steroids as well 

as possible sentiment effect


Worsening tremor most likely related to Parkinson disease


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  Combination with vascular 

dementia given his chronic ischemic changes seen on the MRI of the brain


Bilateral respiratory infiltrate suspicious for aspiration pneumonia.  Improved.




Urinary retention, status post Duarte catheter


Mild abdominal pain, rule out intra-abdominal pathology


Generalized weakness and deconditioning


Mild acute kidney injury and dehydration, improved


Alzheimer dementia


History of Parkinson disease


History of prostate disease status post surgical removal of the prostate


History of pneumonia




















Plan: 





This is a pleasant 82 old male who presents with pneumonia and metabolic 

encephalopathy


Change Zosyn and to Augmentin


Discontinue sentiment


Patient is started on melatonin.  Resume Klonopin 0.25


Neurologist have cleared the patient for discharge


Duarte catheter discontinued and patient continued on Flomax, no further 

hypertension


Family to decide about placement and  on the case


labs and medication were reviewed..  Continue same treatment.  Continue with 

symptomatic treatment.  Resume home medication.  Monitor lytes and vitals.  DVT 

and GI prophylaxis.  Further recommendations depends on the clinical course of 

the patient


DVT prophylaxis: Subcutaneous heparin


GI Prophylaxis: Pepcid


PT/OT: Recommended subacute rehab, versus placed  consulted


Prognosis is guarded

## 2022-06-29 NOTE — P.PN
Subjective


Progress Note Date: 06/28/22





Patient was seen for a follow-up.  Patient's 2 sons were present today.





Patient initially seen by Dr. Walt Alfaro.  Please refer to his note for details.

 Patient came to the hospital because of confusion.  Patient has history of Lewy

body dementia, diagnosed by outside neurologist as Parkinson's disease, although

Dr. Alfaro felt it was more of diffuse Lewy body dementia.  Patient has history 

of REM sleep behavior disorder.  Dr. Alfaro started patient on melatonin and 

clonazepam 0.5 mg at bedtime.  The first dose he received was on Sunday night 

and Monday he was sleeping throughout the day.  The dose of Klonopin was 

decreased to 0.25 mg which he received on Monday night.  Per patient's son, he 

slept through the night, and woke up at around 10 AM he had some light 

breakfast.  He was more coherent, but difficult to deal with, as he would not 

eat refusing medications.  Patient did not have any REM behavior/acting out.  

Patient's son showed me the video taken on his cell phone, when patient had se

davie leg movements and body during sleep at night.  This was videotaped by his 

son while patient was in the hospital in the current admission.  He has not had 

those spells since being on Klonopin.  No tremors or shaking noticed.





Per patient's son report, before he came, he was walking although with short 

steps was getting around.  He sometimes uses walker when he feels dizzy, 

although other times he would not use any device or a walker even "for months", 

per the report.  He was quite independent and although his wife has to cut up 

things in his plate, and he would eat and take pills on his own.





Objective





- Vital Signs


Vital signs: 


                                   Vital Signs











Temp  97.8 F   06/28/22 11:40


 


Pulse  85   06/28/22 11:40


 


Resp  18   06/28/22 11:40


 


BP  113/74   06/28/22 11:40


 


Pulse Ox  96   06/28/22 11:40


 


FiO2  21   06/26/22 15:45








                                 Intake & Output











 06/27/22 06/28/22 06/28/22





 18:59 06:59 18:59


 


Intake Total 228  


 


Output Total   701


 


Balance 228  -701


 


Weight 68.422 kg  


 


Intake:   


 


  IV 10  


 


    Invasive Line 4 10  


 


  Oral 218  


 


Output:   


 


  Urine   700


 


  Stool   1


 


Other:   


 


  Voiding Method Diaper Diaper Diaper


 


  # Voids 4 2 1


 


  # Bowel Movements 2  2














- Exam





Patient is somnolent, slightly encephalopathic, but does wake up and follows 

commands.  Patient states "I'm just interested in getting out of here".  He is 

able to recognize his son Mr. Craven.  His speech is clear with no 

obvious aphasia or dysarthria.  Pupils are equal, round and reacting to light.  

Visual fields could not be tested.  Face is symmetric and tongue protrudes the 

midline.





Muscle strength appears normal in the arms.  His strength is equal in the legs. 

Reflexes are 2 in the upper limbs, 2 in the lower limbs and plantars downgoing 

bilaterally.





Tone is mild to moderately decreased on the right, mildly on the left.  No 

obvious tremors at rest noted.  Slightly bradykinetic.





Cerebellar function difficult to assess.














- Labs


CBC & Chem 7: 


                                 06/26/22 06:45





                                 06/28/22 11:36


Labs: 


                  Abnormal Lab Results - Last 24 Hours (Table)











  06/27/22 06/28/22 Range/Units





  07:07 11:36 


 


Chloride   110 H  ()  mmol/L


 


Carbon Dioxide   21 L  (22-30)  mmol/L


 


BUN   28 H  (9-20)  mg/dL


 


Creatinine   1.37 H  (0.66-1.25)  mg/dL


 


Glucose   117 H  (74-99)  mg/dL


 


AST   62 H  (17-59)  U/L


 


ALT   69 H  (4-49)  U/L


 


Procalcitonin  0.11 H   (0.02-0.09)  ng/mL








                      Microbiology - Last 24 Hours (Table)











 06/21/22 17:32 Blood Culture - Final





 Blood    No Growth after 144 hours














Assessment and Plan


Assessment: 





Encephalopathy likely due to medication effect (Steroids) and metabolic 

encephalopathy  Also has component due to his advanced dementia. ---mentation 

improving compared to presentation but not back to baseline.


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 

patient initially had dementia then parkinson's symptoms and has visual 

hallucination).  His condition seems advanced


REM sleep behavior disorder


Mild acute kidney injury due to dehydration


Urinary retention s/ [place] cath


History of Prostate disease s/p resection


Plan: 





* Patient's somnolence has improved with decreasing dose of Klonopin down to 

  0.25 mg at bedtime.  We will keep him on the same dose.  He has not had any 

  dream acting out since on Klonopin.


* Dr. Alfaro has discontinued Sinemet on 06/23/2022 related to visual 

  hallucinations.  If his mobility does not improve, then may have to resume 

  Sinemet.


* Continue melatonin 3 mg at bedtime.


* B12 1290, TSH is normal 1.98.  Ammonia <9.  Coronal while is negative.  CT 

  head showed no acute process.


* EEG shows background slowing consistent with mild to moderate encephalopathy. 

  No epileptiform activity.


* MRI brain showed no acute abnormality.  I personally reviewed MRI, shows 

  significant generalized atrophy, prominence of ventricles, but appears 

  consistent with amount of cortical atrophy.  Small vessel disease.  No acute 

  process.


* Discussed with patient's sons and primary physician in detail.

## 2022-06-30 LAB
ALBUMIN SERPL-MCNC: 3.5 G/DL (ref 3.5–5)
ALBUMIN/GLOB SERPL: 1.3 {RATIO}
ALP SERPL-CCNC: 64 U/L (ref 38–126)
ALT SERPL-CCNC: 7 U/L (ref 4–49)
ANION GAP SERPL CALC-SCNC: 9 MMOL/L
AST SERPL-CCNC: 44 U/L (ref 17–59)
BASOPHILS # BLD AUTO: 0.1 K/UL (ref 0–0.2)
BASOPHILS NFR BLD AUTO: 2 %
BUN SERPL-SCNC: 22 MG/DL (ref 9–20)
CALCIUM SPEC-MCNC: 9.2 MG/DL (ref 8.4–10.2)
CHLORIDE SERPL-SCNC: 107 MMOL/L (ref 98–107)
CO2 SERPL-SCNC: 23 MMOL/L (ref 22–30)
EOSINOPHIL # BLD AUTO: 0.2 K/UL (ref 0–0.7)
EOSINOPHIL NFR BLD AUTO: 3 %
ERYTHROCYTE [DISTWIDTH] IN BLOOD BY AUTOMATED COUNT: 4.95 M/UL (ref 4.3–5.9)
ERYTHROCYTE [DISTWIDTH] IN BLOOD: 12.8 % (ref 11.5–15.5)
GLOBULIN SER CALC-MCNC: 2.6 G/DL
GLUCOSE SERPL-MCNC: 112 MG/DL (ref 74–99)
HCT VFR BLD AUTO: 44.8 % (ref 39–53)
HGB BLD-MCNC: 14.3 GM/DL (ref 13–17.5)
LYMPHOCYTES # SPEC AUTO: 0.7 K/UL (ref 1–4.8)
LYMPHOCYTES NFR SPEC AUTO: 10 %
MCH RBC QN AUTO: 28.9 PG (ref 25–35)
MCHC RBC AUTO-ENTMCNC: 32 G/DL (ref 31–37)
MCV RBC AUTO: 90.6 FL (ref 80–100)
MONOCYTES # BLD AUTO: 0.7 K/UL (ref 0–1)
MONOCYTES NFR BLD AUTO: 10 %
NEUTROPHILS # BLD AUTO: 5.3 K/UL (ref 1.3–7.7)
NEUTROPHILS NFR BLD AUTO: 74 %
PH UR: 6 [PH] (ref 5–8)
PLATELET # BLD AUTO: 238 K/UL (ref 150–450)
POTASSIUM SERPL-SCNC: 4.2 MMOL/L (ref 3.5–5.1)
PROT SERPL-MCNC: 6.1 G/DL (ref 6.3–8.2)
SODIUM SERPL-SCNC: 139 MMOL/L (ref 137–145)
SP GR UR: 1.01 (ref 1–1.03)
UROBILINOGEN UR QL STRIP: <2 MG/DL (ref ?–2)
WBC # BLD AUTO: 7.2 K/UL (ref 3.8–10.6)

## 2022-06-30 RX ADMIN — Medication SCH MCG: at 07:12

## 2022-06-30 RX ADMIN — BUDESONIDE SCH MG: 1 SUSPENSION RESPIRATORY (INHALATION) at 08:30

## 2022-06-30 RX ADMIN — IPRATROPIUM BROMIDE AND ALBUTEROL SULFATE SCH ML: .5; 3 SOLUTION RESPIRATORY (INHALATION) at 16:47

## 2022-06-30 RX ADMIN — FAMOTIDINE SCH MG: 20 TABLET, FILM COATED ORAL at 07:12

## 2022-06-30 RX ADMIN — BUDESONIDE SCH: 1 SUSPENSION RESPIRATORY (INHALATION) at 08:19

## 2022-06-30 RX ADMIN — BUDESONIDE SCH MG: 1 SUSPENSION RESPIRATORY (INHALATION) at 19:52

## 2022-06-30 RX ADMIN — HEPARIN SODIUM SCH UNIT: 5000 INJECTION INTRAVENOUS; SUBCUTANEOUS at 07:12

## 2022-06-30 RX ADMIN — Medication SCH: at 22:18

## 2022-06-30 RX ADMIN — IPRATROPIUM BROMIDE AND ALBUTEROL SULFATE SCH: .5; 3 SOLUTION RESPIRATORY (INHALATION) at 08:19

## 2022-06-30 RX ADMIN — TAMSULOSIN HYDROCHLORIDE SCH MG: 0.4 CAPSULE ORAL at 17:20

## 2022-06-30 RX ADMIN — IPRATROPIUM BROMIDE AND ALBUTEROL SULFATE SCH ML: .5; 3 SOLUTION RESPIRATORY (INHALATION) at 19:52

## 2022-06-30 RX ADMIN — MEMANTINE HYDROCHLORIDE SCH: 10 TABLET ORAL at 22:18

## 2022-06-30 RX ADMIN — HEPARIN SODIUM SCH UNIT: 5000 INJECTION INTRAVENOUS; SUBCUTANEOUS at 22:45

## 2022-06-30 RX ADMIN — IPRATROPIUM BROMIDE AND ALBUTEROL SULFATE SCH: .5; 3 SOLUTION RESPIRATORY (INHALATION) at 12:16

## 2022-06-30 RX ADMIN — LATANOPROST SCH: 50 SOLUTION OPHTHALMIC at 22:19

## 2022-06-30 RX ADMIN — MEMANTINE HYDROCHLORIDE SCH MG: 10 TABLET ORAL at 07:12

## 2022-06-30 RX ADMIN — TIMOLOL MALEATE SCH DROPS: 5 SOLUTION OPHTHALMIC at 07:13

## 2022-06-30 RX ADMIN — IPRATROPIUM BROMIDE AND ALBUTEROL SULFATE SCH ML: .5; 3 SOLUTION RESPIRATORY (INHALATION) at 08:30

## 2022-06-30 NOTE — XR
EXAMINATION TYPE: XR chest 1V

 

DATE OF EXAM: 6/30/2022 9:41 PM

 

COMPARISON: Chest radiographs from 6/27/2022.

 

TECHNIQUE: XR chest 1V Frontal view of the chest.

 

CLINICAL INDICATION:Male, 82 years old with history of COUGH; 

 

FINDINGS: 

Lungs/Pleura: There is no evidence of pleural effusion, focal consolidation, or pneumothorax.  

Pulmonary vascularity: Pulmonary vascular congestion.

Heart/mediastinum: Cardiomediastinal silhouette is enlarged and stable.

Musculoskeletal: No acute osseous pathology.

 

IMPRESSION: 

Cardiomegaly and mild pulmonary vascular congestion. Correlate with BNP for congestive heart failure.

## 2022-06-30 NOTE — P.PN
Subjective





This is a pleasant 82 minute with past medical history of : Dementia, Pneumonia,

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


Presents because of cough and subjective fever and generalized weakness.  

Patient somewhat disappointed historian and information was obtained with the 

help of his son they'll at bedside.  Family stated that patient has been 

complaining of from cough as well as his wife, so they took him to the clinic in

[place] which referred him to the hospital for his weakness.  As per son patient 

has been having this cough and for the last 3 days and it is associated in the 

beginning with sore throat.  Also he had some runny nose but the son states that

this is a chronic.  Currently no sore throat, no chest pain or dyspnea at rest. 

Patient denies fever.


No diarrhea or vomiting


Her family stated that patient have difficulty getting up from chair or walking,

he started using her walker the last few days 


Also patient looks confused.  The family also does not look much concerned 

because this been happening up and down.  Patient knows he is in the hospital 

but could not tell which hospital or sitting, he is disoriented to time and 

person.  He thought his son they'll at bedside is his brother Walter.  Patient also

has no insight into his illness


Patient also has history of Parkinson disease for many years, he has mild 

resting tremor today.


Patient also complaining of from difficulty passing urine but no dysuria.


No smoking, alcohol or illicit tracts





Patient is hemodynamically stable.  He is afebrile.


Labs including CBC showed only mild lymphopenia, BMP showing mildly elevated 

creatinine 1.29 compared to baseline of 1.1.


Rest of BMP and liver enzymes are unremarkable.  Troponin is negative less than 

0.012.  ProBNP is negative at 325.  Urinalysis is negative.


Coronavirus and influenza viruses are undetected


EKG showing junctional rhythm


While chest x-ray showing congestive heart failure with small pleural effusion 

as per report 


On admission patient received 1 dose of ceftriaxone.  He was started on





06/21/2022


Patient today still confused, he has difficulty holding stop because of his 

worsening tremors in both upper and lower extremities as well as his head.  It 

looks more stiff.  This could be related to his worsening Parkinson before we 

going to consult neurology service.


Repeat chest x-ray showed clearing of pulmonary congestion and interstitial i

nfiltrates per radiologist.


Echocardiogram showing preserved ejection fraction 55-60%, CT of the brain is 

negative for acute process.


Chest x-ray showed clearing of pulmonary congestion with interstitial 

infiltrates improved radiologist.  Poorcalcitonin is not significantly elevated

and it is only at 0.11.


No fever.  No significant tachycardia or tachypnea.


His medication.  He was started on Solu-Medrol and antibiotics changed to Zosyn.




Also patient will be transferred to


Discussed with the son at bedside.  





06/22/2022


I assessed the patient twice first occurred in the morning and later on together

with the neurologist, and both occasions the Sunday was at bedside.  Patient 

today is more confused, he does not follow commands or answer questions, he has 

tremor and abnormal jerking movements of both of upper and lower extremities 

especially at rest but more patient tried to move or patient provoked.


Patient withdrawal to painful stimuli.  No abnormal posters.  Patient paid 

attention and opens eyes when call his name.  Neurologist on the case and 

recommended EEG which shows no seizure-like activity.  There is mild-to-moderate

encephalopathy.


Repeat his x-ray showing no consolidation and was reviewed by myself.


Patient remains on Zosyn and we will repeatcalcitonin also we will repeat other

labs.  Repeat CBC, BMP magnesium, liver enzymes and poorcalcitonin.


B12, hemoglobin A1c, folic acid and TSH were unremarkable.


Echocardiogram showed ejection fraction of 55-60%.


Suspicion of infection is low however patient tripped on antibiotic.  

Possibility patient has last dementia and Parkinson disease, also with may be 

affected by medication.  Therefore steroids was discontinued today.  Patient on 

high-dose of donpezil , memantine and Sinemet.








06/23/2022


Patient is more calm today, less confused, he is a sleepy and wakes up to verbal

stimuli, he answers questions and follow commands more appropriately compared to

yesterday.


He is forgetful, he could not recognize his son at bedside


No evidence of cellulitis, he has some mild abdominal tenderness especially on 

the right side short of its significance however we going to recommend computed 

tomography scan of the abdomen with oral contrast.


Also his creatinine went up to 1.6 from 1.2.  We lowered his IV fluids D5 normal

saline down to 50 mL per hour.  Also will check bladder scan was more than 300 

with evidence of ureteral retention.  We start Flomax and consider straight cath

versus Duarte catheter


Discussed with family to encourage reorientation to the patient reacted


He is eating well and tolerates diet, he finished 50-75% of his diet today.


Neurology input is appreciated, Sinemet was discontinued.  Steroids was 

discontinued also from yesterday which might contribute to patient clinical 

picture.  Patient was started on melatonin and small dose of Klonopin and we 

will keep monitoring





06/24/2022


Both brothers were at bedside today together with the neurologist while 

examining the patient.  As per brothers yesterday evening patient was more awake

and he had good conversation with brothers, and he couldn't recognize their 

names correctly.  However this morning is again more confused which is goes with

delirium secondary to toxic metabolic encephalopathy.  MRI of the brain is 

negative for acute process or infarct or ischemia.  But showing age-related 

atrophy and chronic small vessel ischemia.


Creatinine actually is trending down today 1.6 down to 1.4.


Chest x-ray showing no acute changes but poorcalcitonin is trending down 0.15 

showing improvement in his infection which is most likely pneumonia seen on CAT 

scan of the abdomen yesterday.


Patient remains on Zosyn.


Patient has evidence of urine retention which is resolved after a pleasant Duarte

catheter.  This may be contributing to altered mental status.  We'll start 

Flomax as well.  Once this might benefit from a urologist as an outpatient.  

Contact information placed in the discharge instructions





06/25/2022


Patient awake, somewhat sleepy and lethargic but he knows his name and he was in

the hospital but he was disoriented worse hours, today his clinical picture 

similar to the day he came to the hospital.  He denies any specific other 

symptoms.  I discussed the case with neurology today.  Patient continued on 

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


Other than that vitals are stable.


Brain MRI showing no acute abnormality just H related atrophy and chronic small 

vessel ischemia.


Patient continued on Zosyn, normal saline at 50 mL per hour, melatonin and 

Klonopin as above, Sinemet and Aricept are discontinued.


Still has  Duarte catheter for urinary retention


There is suspicion patient is keep swallowing problems which might lead to 

aspiration, we'll ask for swallowing evaluation which is pending.  Monday.  In 

the meantime discussed with the staff to keep aspiration precautions.


Discussed with the son at bedside


Prognosis remains guarded 





06/26/2022


Patient is still confused although it looks similar to presentation and improved

compared to last 3-4 days ago.   at bedside states that her father could 

recognize her although he had some confusion about the other information.


Also patient has not been eating well, we'll do a swallow evaluation tomorrow.


Continue with Duarte catheter


Creatinine stable or slightly better at 1.35.


We will repeat labs tomorrow patient still on Zosyn, normal saline at 50 mL/h 

and melatonin and Klonopin 0.5 recommended by neurologist.  Sinemet and Aricept 

were discontinued.  


Family were thinking of taking home tomorrow but they agree to stay in the 

hospital for now.  Patient can be transferred out of select unit to the general 

medical floor with close monitoring.





06/27/2022


Patient still confused and sleepy although his mentation is walks and waning, no

respiratory distress, no abdominal complaint,


Duarte catheter was discontinued, bladder scan today is 152-200, so he does not 

need another Duarte catheter for now.


Also he passed swallow evaluation with special diet, (dysphagia level 3 

chopped/thin liquids. Further recommended small bites/sips, upright positioning 

with all PO intake and 1:1 assist. Pt may have straws)


Histologic clumping in normal saline at 50 L/h, which a chest x-ray, no 

pulmonary congestion, no consolidation is breathing quietly.


He finishes half of his meals


I talked to both sons at bedside they haven't decided whether to take the 

patient home or ECF


Possible discharge in 24-48 hours if he remains stable and keep improvement





06/28/2022


Patient is very sleepy today even after the dose of Klonopin lower 0.25 

yesterday therefore we will control the dose of Klonopin tonight and keep 

monitoring tomorrow.


Also we will check labs today including BMP, liver enzymes and ammonia level.


Other than that his poorcalcitonin actually is trending down at 0.11 and he is 

kept on Zosyn, no significant respiratory symptoms.  And his abdomen looks soft.

 Bladder scan does not need Duarte catheter now





06/29/2022


Patient is more awake compared to yesterday however he still confused, Klonopin 

dose remains at 0.25 and I discussed the case with the neurologist.


Sinemet remains on hold for neurologist recommendation however her neurologist 

if that does not improve on patient cannot work then he may be placed back on 

Sinemet.


Zosyn can be switched to Augmentin as his pneumonia is improving.  No fever or 

leukocytosis.


Discussed with son at bedside and agree with the plan.


Patient pending placement





06/30/2022


Patient today showing more severe signs and symptoms of Parkinson disease.  He 

has a mask face, he has melatonin his voice, he has rigidity in the upper 

extremities, recent trauma and cogwheel movement of his wrist.  I discussed the 

case with neurologist Dr. Sharif and he agrees to start the patient back on his 

Sinemet.


i Signed his  papers for placement today.  


His pneumonia is resolved and patient switched to oral antibiotic with 

Augmentin.  Think patient has passed swallow evaluation.  And they agree that 

the infection the steroids made him worse and now with controlled when he came 

back on his Parkinson disease treatment.  Given his advanced age most likely he 

has advanced arcus and disease and sewn even the sinemet will not be working 

well for him


And suspended discharge in 24-48 hours once placement is achieved





Objective





- Vital Signs


Vital signs: 


                                   Vital Signs











Temp  97.9 F   06/30/22 07:38


 


Pulse  92   06/30/22 08:43


 


Resp  18   06/30/22 07:38


 


BP  139/87   06/30/22 07:38


 


Pulse Ox  96   06/30/22 07:38


 


FiO2  21   06/26/22 15:45








                                 Intake & Output











 06/29/22 06/30/22 06/30/22





 18:59 06:59 18:59


 


Intake Total 961  296


 


Output Total 950 500 


 


Balance 11 -500 296


 


Intake:   


 


    


 


    Dextrose 5%-0.9% NaCl 1, 600  





    000 ml @ 50 mls/hr IV .   





    Q20H [facility] Rx#:[id number]   


 


  Oral 361  296


 


Output:   


 


  Urine 950 500 


 


Other:   


 


  Voiding Method  External Catheter 


 


  # Bowel Movements  0 














- Exam








-GENERAL: The patient is confused, but more awake, he does follow commands 

today, not in any acute distress.  under developed and generalized weakness


HEENT: Pupils are round and equally reacting to light. EOMI. No scleral icterus.

No conjunctival pallor. Normocephalic, atraumatic. No pharyngeal erythema. No 

thyromegaly. 


CARDIOVASCULAR: S1 and S2 present. No murmurs, rubs, or gallops. 


PULMONARY: Chest is clear to auscultation, no wheezing or crackles.  


-ABDOMEN: Soft, mild right abdominal tenderness, no rebound tenderness, no 

guarding, nondistended, normoactive bowel sounds. No palpable organomegaly. 


MUSCULOSKELETAL: No joint swelling or deformity. 


EXTREMITIES: No cyanosis, clubbing, or pedal edema. 


-NEUROLOGICAL: Gross neurological examination did not reveal any focal deficits.

 Patient is confused, he does not follows commands.  Cranial nerves are grossly 

intact.  Meningeal signs are absent  He has some generalized weakness but he d

enies any numbness


SKIN: No rashes. No petechiae





- Labs


CBC & Chem 7: 


                                 06/26/22 06:45





                                 06/28/22 11:36





Assessment and Plan


Assessment: 








Altered mental status most likely metabolic and more toxic encephalopathy,on  

the top of advanced dementia.  Patient condition worsened with steroids as well 

as possible sentiment effect


Worsening tremor most likely related to Parkinson disease


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  Combination with vascular 

dementia given his chronic ischemic changes seen on the MRI of the brain


Bilateral respiratory infiltrate suspicious for aspiration pneumonia.  Improved.




Urinary retention, status post Duarte catheter


Mild abdominal pain, rule out intra-abdominal pathology


Generalized weakness and deconditioning


Mild acute kidney injury and dehydration, improved


Alzheimer dementia


History of Parkinson disease


History of prostate disease status post surgical removal of the prostate


History of pneumonia




















Plan: 





This is a pleasant 82 old male who presents with pneumonia and metabolic 

encephalopathy


Continue with Augmentin


Resume sentiment, discussed with the neurologist and he agrees


Patient is started on melatonin.  Resume Klonopin 0.25


Neurologist have cleared the patient for discharge


Duarte catheter discontinued and patient continued on Flomax, no further 

hypertension


Family to decide about placement and  on the case


labs and medication were reviewed..  Continue same treatment.  Continue with 

symptomatic treatment.  Resume home medication.  Monitor lytes and vitals.  DVT 

and GI prophylaxis.  Further recommendations depends on the clinical course of 

the patient


DVT prophylaxis: Subcutaneous heparin


GI Prophylaxis: Pepcid


PT/OT: Recommended subacute rehab, versus placed  consulted


Prognosis is guarded, including long-term prognosis as he is getting close to 

end stage Parkinson disease given his age, although when asked with some he 

could not tell along patient has been Parkinson disease

## 2022-07-01 VITALS — HEART RATE: 80 BPM

## 2022-07-01 VITALS — DIASTOLIC BLOOD PRESSURE: 53 MMHG | SYSTOLIC BLOOD PRESSURE: 99 MMHG | TEMPERATURE: 97.6 F

## 2022-07-01 VITALS — RESPIRATION RATE: 18 BRPM

## 2022-07-01 RX ADMIN — FAMOTIDINE SCH MG: 20 TABLET, FILM COATED ORAL at 09:06

## 2022-07-01 RX ADMIN — BUDESONIDE SCH MG: 1 SUSPENSION RESPIRATORY (INHALATION) at 08:30

## 2022-07-01 RX ADMIN — Medication SCH MCG: at 09:06

## 2022-07-01 RX ADMIN — TIMOLOL MALEATE SCH DROPS: 5 SOLUTION OPHTHALMIC at 09:07

## 2022-07-01 RX ADMIN — IPRATROPIUM BROMIDE AND ALBUTEROL SULFATE SCH ML: .5; 3 SOLUTION RESPIRATORY (INHALATION) at 08:30

## 2022-07-01 RX ADMIN — DEXTROSE MONOHYDRATE AND SODIUM CHLORIDE SCH MLS/HR: 5; .9 INJECTION, SOLUTION INTRAVENOUS at 09:06

## 2022-07-01 RX ADMIN — HEPARIN SODIUM SCH UNIT: 5000 INJECTION INTRAVENOUS; SUBCUTANEOUS at 09:06

## 2022-07-01 RX ADMIN — DEXTROSE MONOHYDRATE AND SODIUM CHLORIDE SCH: 5; .9 INJECTION, SOLUTION INTRAVENOUS at 00:51

## 2022-07-01 RX ADMIN — MEMANTINE HYDROCHLORIDE SCH MG: 10 TABLET ORAL at 09:06

## 2022-07-01 RX ADMIN — IPRATROPIUM BROMIDE AND ALBUTEROL SULFATE SCH ML: .5; 3 SOLUTION RESPIRATORY (INHALATION) at 12:27

## 2022-07-01 NOTE — P.PN
Subjective


Progress Note Date: 06/30/22 06/30/2022: Patient's one of the son was present today.  Apparently I have 

discussed with Dr. Resendiz and both of us agree that patient is getting more 

stiff, more rigid, likely because of stopping Sinemet.  We decided to resume 

Sinemet, to help with his mobility.  Patient's son was upset why the medication 

was started because patient was twitching, leg jerking with the Sinemet 

previously, that was discontinued by Dr. Alfaro.  I informed him the reason that 

Sinemet was resumed.  Patient's son states that when he came in at 3 PM, he was 

"like this", worse than yesterday, not responding as well, not talking as well 

as yesterday.  It appears that patient has been generally much worse throughout 

the day, whereas he received 2 tablets of Sinemet at 5:20 PM (condition was 

worse even before he received his Sinemet.)  Patient's son says that he is not 

eating, more thrashing.  Last night he was more alert, was making eye contact, 

coughing.  He was able to take a couple drinks.  He was able to take medication 

crushed with applesauce.  Today he has not ate.  He is spitting it out.  He is 

receiving D5 0.45 Saline drip.





06/29/2022: Patient was seen for follow-up.  Patient's one of the son was 

present today.  Patient's son mentions that he was taking Sinemet 25/100, 1 

tablet at 8 AM and a second one tablet 5 hours later at 1 PM.  He has been on 

this regimen since November 2021.  Patient's son mentions that he was elevated 

from 12 midnight to 1 AM, then tried to pull on the catheter.  He was again 

awake at 3:30 AM, and stayed up for 1 hour.  He was confused.  He was finally up

at 6 AM.  He was not agitated, not coherent not jerking.  He is slightly more 

talkative.  He sat on the chair 2 times.  Patient states "I got an issue".





06/28/2022: Patient was seen for a follow-up.  Patient's 2 sons were present 

today.





Patient initially seen by Dr. Walt Alfaro.  Please refer to his note for details.

 Patient came to the hospital because of confusion.  Patient has history of Lewy

body dementia, diagnosed by outside neurologist as Parkinson's disease, although

Dr. Alfaro felt it was more of diffuse Lewy body dementia.  Patient has history 

of REM sleep behavior disorder.  Dr. Alfaro started patient on melatonin and 

clonazepam 0.5 mg at bedtime.  The first dose he received was on Sunday night 

and Monday he was sleeping throughout the day.  The dose of Klonopin was 

decreased to 0.25 mg which he received on Monday night.  Per patient's son, he 

slept through the night, and woke up at around 10 AM he had some light 

breakfast.  He was more coherent, but difficult to deal with, as he would not 

eat refusing medications.  Patient did not have any REM behavior/acting out.  

Patient's son showed me the video taken on his cell phone, when patient had 

severe leg movements and body during sleep at night.  This was videotaped by his

son while patient was in the hospital in the current admission.  He has not had 

those spells since being on Klonopin.  No tremors or shaking noticed.





Per patient's son report, before he came, he was walking although with short 

steps was getting around.  He sometimes uses walker when he feels dizzy, 

although other times he would not use any device or a walker even "for months", 

per the report.  He was quite independent and although his wife has to cut up 

things in his plate, and he would eat and take pills on his own.





Objective





- Vital Signs


Vital signs: 


                                   Vital Signs











Temp  97.9 F   07/01/22 01:31


 


Pulse  78   07/01/22 08:39


 


Resp  18   07/01/22 08:39


 


BP  109/69   07/01/22 01:31


 


Pulse Ox  96   07/01/22 08:31


 


FiO2  21   06/26/22 15:45








                                 Intake & Output











 06/30/22 07/01/22 07/01/22





 18:59 06:59 18:59


 


Intake Total 414  


 


Output Total 400  


 


Balance 14  


 


Intake:   


 


  Oral 414  


 


Output:   


 


  Urine 400  


 


Other:   


 


  Voiding Method  External Catheter 


 


  # Bowel Movements  0 














- Exam





Patient is more encephalopathic, does not follow much commands.  He makes some 

eye contact.  Patient not able to tell name of his son, which he was able to do 

couple days before.  I don't believe this worsening is from Sinemet, as he has 

been like this since the morning, and patient's son witnessed that he was "like 

this" when he came in at 3 PM and patient did not receive his Sinemet up until 

5:20 PM.  Tone is increased moderately bilaterally with some clasp-knifing.    

Pupils are equal, round and reacting to light.  Visual fields could not be 

tested.  Face is symmetric and tongue protrudes the midline.





Muscle strength appears normal in the arms.  His strength is equal in the legs. 

Reflexes are 2 in the upper limbs, 2 in the lower limbs and plantars downgoing 

bilaterally.





He appears bradykinetic.





Cerebellar function difficult to assess.














- Labs


CBC & Chem 7: 


                                 06/30/22 21:08





                                 06/30/22 21:08


Labs: 


                  Abnormal Lab Results - Last 24 Hours (Table)











  06/30/22 06/30/22 06/30/22 Range/Units





  21:08 21:08 23:33 


 


Lymphocytes #  0.7 L    (1.0-4.8)  k/uL


 


BUN   22 H   (9-20)  mg/dL


 


Creatinine   1.47 H   (0.66-1.25)  mg/dL


 


Glucose   112 H   (74-99)  mg/dL


 


Total Protein   6.1 L   (6.3-8.2)  g/dL


 


Urine Protein    Trace H  (Negative)  














Assessment and Plan


Assessment: 





Encephalopathy likely due to medication effect (Steroids) and metabolic 

encephalopathy  Also has component due to his advanced dementia. ---mentation 

improving compared to presentation but not back to baseline.  Patient's 

encephalopathy has again gotten worse, unclear cause.  Rule out 

infectious/metabolic process.


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 

patient initially had dementia then parkinson's symptoms and has visual 

hallucination).  His condition seems advanced


REM sleep behavior disorder


Mild acute kidney injury due to dehydration


Urinary retention s/ [place] cath


History of Prostate disease s/p resection


Plan: 





* Patient's encephalopathy seems to have gotten worse.  We checked stat CBC, 

  which is completely normal with WBC count 7.2, hemoglobin 14.3.  Electrolytes 

  are normal, BUN is improved 22, creatinine 1.47, slightly worse.  Hepatic 

  panel is normal.  UA shows no infection.  Chest x-ray revealed cardiomegaly 

  and mild pulmonary vascular congestion.  Correlate with BNP for congestive 

  heart failure.  No obvious metabolic cause is identified.  We will stop 

  Sinemet again as per patient's request.





* Patient's somnolence has improved with decreasing dose of Klonopin down to 

  0.25 mg at bedtime.  We will keep him on the same dose.  He has not had any 

  dream acting out since on Klonopin.


* Dr. Alfaro has discontinued Sinemet on 06/23/2022 related to visual hallucinat

  ions.  


* Continue melatonin 3 mg at bedtime.


* B12 1290, TSH is normal 1.98.  Ammonia <9.  Corona virus is negative.  CT head

  showed no acute process.


* EEG shows background slowing consistent with mild to moderate encephalopathy. 

  No epileptiform activity.


* MRI brain showed no acute abnormality.  I personally reviewed MRI, shows 

  significant generalized atrophy, prominence of ventricles, but appears 

  consistent with amount of cortical atrophy.  Small vessel disease.  No acute 

  process.


* Discussed with patient's son in detail.

## 2022-07-01 NOTE — P.DS
Providers


Date of admission: 


06/19/22 17:39





Attending physician: 


Fox Chang





Consults: 





                                        





06/21/22 14:59


Consult Physician Routine 


   Consulting Provider: Walt Alfaro


   Consult Reason/Comments: worsening parkinson s/s


   Do you want consulting provider notified?: Yes





06/21/22 16:59


Consult Physician Routine 


   Consulting Provider: Walt Alfaro


   Consult Reason/Comments: worsening parkinson


   Do you want consulting provider notified?: Yes











Primary care physician: 


Xenia Camacho





Hospital Course: 





Diagnoses


Bilateral respiratory infiltrate suspicious for aspiration pneumonia.  Improved.




Altered mental status most likely metabolic and more toxic encephalopathy,on  

the top of advanced dementia and advanced Parkinson disease.  Patient condition 

worsened with steroids as well as possible sentiment effect


Worsening rest tremor , rigidity, akinesia, mask face and monotonous voice most 

likely related to Parkinson disease


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  Combination with vascular 

dementia given his chronic ischemic changes seen on the MRI of the brain


Urinary retention, status post Duarte catheter which is has to be replaced prior 

to discharge for urinary retention of 458


Generalized weakness and deconditioning


Mild acute kidney injury , could be related to his obstructive uropathy and 

chronic kidney disease


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


History of prostate disease status post surgical removal of the prostate


History of pneumonia





Hospital course:


This is a pleasant 81 minute with past medical history of : Dementia, Pneumonia,

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


Presents because of cough and subjective fever and generalized weakness.  

Patient was found to have pneumonia, with aspiration pneumonia and suspected he 

was treated with Zosyn and she showed interval improvement.  His breathing is 

back to normal at baseline now and repeat chest x-ray showed clearance of the 

pneumonia.  He will be discharged short course of oral antibiotics with 

Augmentin area he passed a swallow evaluation but he needs to pursue aspiration 

precaution given his significant for neurological disease


Because of the pneumonia and steroids he got worsening of his neurodegenerative 

disease and Parkinson disease, Sinemet and Aricept were discontinued by 

neurologist at that time on admission.  However family refused to restart 

Sinemet for now for concerns about visual hallucination he does sometimes.


Also neurologist suspected REM behavioral disorder which is known to have it 

before therefore he was started on melatonin and Klonopin 0.25 which he 

tolerates well, however dose of 0.5 mg made him more sleepy


Also he has evidence of urinary retention and Duarte catheter was placed as well 

as Flomax.


EEG did not show any epileptiform discharge but mild-to-moderate encephalopathy.


On the day of discharge patient is awake alert he still have rigidity is still 

confused but quiet and calm.  No respiratory distress.  No other new complaints.


Plan discussed case with the neurologist here for discharge.


At home he was taken (sinemet  mg 1 pill daily at 8 AM and other 2 pills 

at 1 PM) both are held upon discharge upon family request 


Discussed the case with family at bedside every day.  He agrees with the LAD and

to place the patient for rehab upon discharge.


Problems and management plan were discussed with the patient and they verbalized

understanding and acceptance


Patient was found stable and can be discharged to [facility] in guarded prognosis 

however he needs follow-up as an outpatient. Patient was instructed to follow up

with PCP Dr. Camacho within one week and patient agrees


Patient was instructed to follow up with a neurologist in 1-2 weeks, family was 

to follow up with Dr. Walt Alfaro and they have the contact information.





Physical exam


-Gen: patient is a awake, minimal interaction, monitor no specific, confused, no

distress


CVS: S1-S2, RRR, no murmur


Lungs: B/L CTA, no wheezing


Abdomen: soft, no distention, no tenderness, positive bowel sounds


Extremity: no leg edema or induration 


-neurology: Awake and alert, he has stiffness and rigidity in the extremities 

with rest tremor and cogwheel rest tremor.








Time spent more than 35 minutes


Patient Condition at Discharge: Good





Plan - Discharge Summary


New Discharge Prescriptions: 


New


   clonazePAM [KlonoPIN] 0.25 mg PO HS #15 tab





No Action


   polyethylene glycoL 3350 [Miralax] 17 gm PO HS


   Vitamin B-50  1 tab PO DAILY


   Latanoprost Ophth [Xalatan 0.005%] 1 drops BOTH EYES HS


   Cholecalciferol [Vitamin D3 (25 Mcg = 1000 Iu)] 50 mcg PO DAILY


   Timolol 0.5% Ophth Soln [Timoptic 0.5% Ophth Soln] 1 drop BOTH EYES DAILY


   Memantine HCl 10 mg PO BID


   Donepezil HCl [Aricept] 10 mg PO HS


   Turmeric Root Extract [Turmeric] 1,000 mg PO DAILY


   allopurinoL [Allopurinol] 100 mg PO W/SUPPER


   Carbidopa-Levodopa  mg [Sinemet ] 1 tab PO DAILY@0800


   Psyllium Husk (with Sugar) [Metamucil Powder] 1 dose PO HS


   Ginkgo Biloba Leaf Extract [Ginkgo Biloba] 125 mg PO DAILY


   Carbidopa-Levodopa  mg [Sinemet ] 2 tab PO DAILY@1300


Discharge Medication List





Cholecalciferol [Vitamin D3 (25 Mcg = 1000 Iu)] 50 mcg PO DAILY 03/10/21 

[History]


Donepezil HCl [Aricept] 10 mg PO HS 03/10/21 [History]


Latanoprost Ophth [Xalatan 0.005%] 1 drops BOTH EYES HS 03/10/21 [History]


Memantine HCl 10 mg PO BID 03/10/21 [History]


Timolol 0.5% Ophth Soln [Timoptic 0.5% Ophth Soln] 1 drop BOTH EYES DAILY 

03/10/21 [History]


Turmeric Root Extract [Turmeric] 1,000 mg PO DAILY 03/10/21 [History]


Vitamin B-50  1 tab PO DAILY 03/10/21 [History]


polyethylene glycoL 3350 [Miralax] 17 gm PO HS 03/10/21 [History]


Carbidopa-Levodopa  mg [Sinemet ] 1 tab PO DAILY@0800 06/19/22 

[History]


Carbidopa-Levodopa  mg [Sinemet ] 2 tab PO DAILY@1300 06/19/22 

[History]


Ginkgo Biloba Leaf Extract [Ginkgo Biloba] 125 mg PO DAILY 06/19/22 [History]


Psyllium Husk (with Sugar) [Metamucil Powder] 1 dose PO HS 06/19/22 [History]


allopurinoL [Allopurinol] 100 mg PO W/SUPPER 06/19/22 [History]


clonazePAM [KlonoPIN] 0.25 mg PO HS #15 tab 06/30/22 [Rx]








Follow up Appointment(s)/Referral(s): 


Jennifer Breen MD [REFERRING] - 1 Week


Greg Kemp MD [STAFF PHYSICIAN] - 1 Week


Roland Pickens MD [Medical Doctor] - 1 Week


Karolyn Breen MD [REFERRING] - 1 Week


Walt Alfaro MD [STAFF PHYSICIAN] - 1 Week


Xenia Camacho MD [Primary Care Provider] - 1-2 days


Patient Instructions/Handouts:  How to Choose and Use a Wheelchair (GEN), Wheel 

Chair Transfers (ED), Pneumonia (DC), Encephalopathy (DC), How to Transfer a 

Person Safely (DC)


Activity/Diet/Wound Care/Special Instructions: 


 dysphagia level 3 chopped/thin liquids. Further recommended small bites/sips, 

upright positioning with all PO intake and 1:1 assist. Pt may have straws





activity is restricted till you see your doctor 











discharge instructions


- for konopin 0.25 mg HS .... please notify to stop if persistantly somnolent

## 2022-07-01 NOTE — P.PN
Subjective


Progress Note Date: 06/29/22 06/29/2022: Patient was seen for follow-up.  Patient's one of the son was 

present today.  Patient's son mentions that he was taking Sinemet 25/100, 1 

tablet at 8 AM and a second one tablet 5 hours later at 1 PM.  He has been on 

this regimen since November 2021.  Patient's son mentions that he was elevated 

from 12 midnight to 1 AM, then tried to pull on the catheter.  He was again 

awake at 3:30 AM, and stayed up for 1 hour.  He was confused.  He was finally up

at 6 AM.  He was not agitated, not coherent not jerking.  He is slightly more 

talkative.  He sat on the chair 2 times.  Patient states "I got an issue".





06/28/2022: Patient was seen for a follow-up.  Patient's 2 sons were present 

today.





Patient initially seen by Dr. Walt Alfaro.  Please refer to his note for details.

 Patient came to the hospital because of confusion.  Patient has history of Lewy

body dementia, diagnosed by outside neurologist as Parkinson's disease, although

Dr. Alfaro felt it was more of diffuse Lewy body dementia.  Patient has history 

of REM sleep behavior disorder.  Dr. Alfaro started patient on melatonin and 

clonazepam 0.5 mg at bedtime.  The first dose he received was on Sunday night 

and Monday he was sleeping throughout the day.  The dose of Klonopin was 

decreased to 0.25 mg which he received on Monday night.  Per patient's son, he 

slept through the night, and woke up at around 10 AM he had some light oh

kfast.  He was more coherent, but difficult to deal with, as he would not eat 

refusing medications.  Patient did not have any REM behavior/acting out.  

Patient's son showed me the video taken on his cell phone, when patient had 

severe leg movements and body during sleep at night.  This was videotaped by his

son while patient was in the hospital in the current admission.  He has not had 

those spells since being on Klonopin.  No tremors or shaking noticed.





Per patient's son report, before he came, he was walking although with short 

steps was getting around.  He sometimes uses walker when he feels dizzy, 

although other times he would not use any device or a walker even "for months", 

per the report.  He was quite independent and although his wife has to cut up 

things in his plate, and he would eat and take pills on his own.





Objective





- Vital Signs


Vital signs: 


                                   Vital Signs











Temp  98.8 F   06/29/22 13:45


 


Pulse  74   06/29/22 13:45


 


Resp  18   06/29/22 13:45


 


BP  152/96   06/29/22 13:45


 


Pulse Ox  94 L  06/29/22 13:45


 


FiO2  21   06/26/22 15:45








                                 Intake & Output











 06/28/22 06/29/22 06/29/22





 18:59 06:59 18:59


 


Intake Total 0  243


 


Output Total 1001 1200 950


 


Balance -1001 -1200 -707


 


Intake:   


 


  Oral 0  243


 


Output:   


 


  Urine 1000 1200 950


 


  Stool 1  


 


Other:   


 


  Voiding Method Diaper External Catheter 


 


  # Voids 1  


 


  # Bowel Movements 2  














- Exam





Patient is more awake, slightly encephalopathic, but does wake up and follows 

commands.  Patient states "I got an issue".   His speech is clear with no 

obvious aphasia or dysarthria.  Pupils are equal, round and reacting to light.  

Visual fields could not be tested.  Face is symmetric and tongue protrudes the 

midline.





Muscle strength appears normal in the arms.  His strength is equal in the legs. 

Reflexes are 2 in the upper limbs, 2 in the lower limbs and plantars downgoing 

bilaterally.





Tone is moderately increased on the right, mild to moderately on the left.  No 

obvious tremors at rest noted.  Slightly bradykinetic.





Cerebellar function difficult to assess.














- Labs


CBC & Chem 7: 


                                 06/30/22 21:08





                                 06/30/22 21:08





Assessment and Plan


Assessment: 





Encephalopathy likely due to medication effect (Steroids) and metabolic 

encephalopathy  Also has component due to his advanced dementia. ---mentation 

improving compared to presentation but not back to baseline.


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 

patient initially had dementia then parkinson's symptoms and has visual halluc

ination).  His condition seems advanced


REM sleep behavior disorder


Mild acute kidney injury due to dehydration


Urinary retention s/ [place] cath


History of Prostate disease s/p resection


Plan: 





* Patient's somnolence has improved with decreasing dose of Klonopin down to 

  0.25 mg at bedtime.  We will keep him on the same dose.  He has not had any 

  dream acting out since on Klonopin.


* Dr. Alfaro has discontinued Sinemet on 06/23/2022 related to visual 

  hallucinations.  If his mobility does not improve, then may have to resume 

  Sinemet.  Tone is slightly more increased today as compared to yesterday.


* Continue melatonin 3 mg at bedtime.


* B12 1290, TSH is normal 1.98.  Ammonia <9.  Corona virus is negative.  CT head

  showed no acute process.


* EEG shows background slowing consistent with mild to moderate encephalopathy. 

  No epileptiform activity.


* MRI brain showed no acute abnormality.  I personally reviewed MRI, shows 

  significant generalized atrophy, prominence of ventricles, but appears 

  consistent with amount of cortical atrophy.  Small vessel disease.  No acute 

  process.


* Discussed with patient's sons and primary physician in detail.